# Patient Record
Sex: FEMALE | Race: WHITE | Employment: OTHER | URBAN - NONMETROPOLITAN AREA
[De-identification: names, ages, dates, MRNs, and addresses within clinical notes are randomized per-mention and may not be internally consistent; named-entity substitution may affect disease eponyms.]

---

## 2017-11-21 ENCOUNTER — HOSPITAL ENCOUNTER (OUTPATIENT)
Dept: WOMENS IMAGING | Age: 82
Discharge: HOME | End: 2017-11-21
Payer: MEDICARE

## 2017-11-21 DIAGNOSIS — Z12.31 VISIT FOR SCREENING MAMMOGRAM: ICD-10-CM

## 2017-11-21 PROCEDURE — G0202 SCR MAMMO BI INCL CAD: HCPCS

## 2018-03-09 ENCOUNTER — HOSPITAL ENCOUNTER (OUTPATIENT)
Age: 83
Discharge: HOME | End: 2018-03-09
Payer: MEDICARE

## 2018-03-09 ENCOUNTER — HOSPITAL ENCOUNTER (OUTPATIENT)
Dept: GENERAL RADIOLOGY | Age: 83
Discharge: HOME | End: 2018-03-09
Payer: MEDICARE

## 2018-03-09 DIAGNOSIS — R10.12 LEFT UPPER QUADRANT PAIN: ICD-10-CM

## 2018-03-09 LAB
ANISOCYTOSIS: ABNORMAL
BASOPHILS # BLD: 0.4 %
BASOPHILS ABSOLUTE: 0 THOU/MM3 (ref 0–0.1)
EOSINOPHIL # BLD: 1.3 %
EOSINOPHILS ABSOLUTE: 0.1 THOU/MM3 (ref 0–0.4)
HCT VFR BLD CALC: 39.5 % (ref 37–47)
HEMOGLOBIN: 12.8 GM/DL (ref 12–16)
LYMPHOCYTES # BLD: 32.3 %
LYMPHOCYTES ABSOLUTE: 3.2 THOU/MM3 (ref 1–4.8)
MCH RBC QN AUTO: 27.7 PG (ref 27–31)
MCHC RBC AUTO-ENTMCNC: 32.3 GM/DL (ref 33–37)
MCV RBC AUTO: 85.8 FL (ref 81–99)
MONOCYTES # BLD: 7.8 %
MONOCYTES ABSOLUTE: 0.8 THOU/MM3 (ref 0.4–1.3)
NUCLEATED RED BLOOD CELLS: 0 /100 WBC
PDW BLD-RTO: 15.6 % (ref 11.5–14.5)
PLATELET # BLD: 325 THOU/MM3 (ref 130–400)
PMV BLD AUTO: 9 FL (ref 7.4–10.4)
RBC # BLD: 4.61 MILL/MM3 (ref 4.2–5.4)
SEG NEUTROPHILS: 58.2 %
SEGMENTED NEUTROPHILS ABSOLUTE COUNT: 5.7 THOU/MM3 (ref 1.8–7.7)
WBC # BLD: 9.8 THOU/MM3 (ref 4.8–10.8)

## 2018-03-09 PROCEDURE — 74018 RADEX ABDOMEN 1 VIEW: CPT

## 2018-03-09 PROCEDURE — 36415 COLL VENOUS BLD VENIPUNCTURE: CPT

## 2018-03-09 PROCEDURE — 85025 COMPLETE CBC W/AUTO DIFF WBC: CPT

## 2018-03-10 ENCOUNTER — APPOINTMENT (OUTPATIENT)
Dept: GENERAL RADIOLOGY | Age: 83
End: 2018-03-10
Payer: MEDICARE

## 2018-03-10 ENCOUNTER — APPOINTMENT (OUTPATIENT)
Dept: CT IMAGING | Age: 83
End: 2018-03-10
Payer: MEDICARE

## 2018-03-10 ENCOUNTER — HOSPITAL ENCOUNTER (OUTPATIENT)
Age: 83
Setting detail: OBSERVATION
Discharge: HOME | End: 2018-03-12
Attending: INTERNAL MEDICINE | Admitting: INTERNAL MEDICINE
Payer: MEDICARE

## 2018-03-10 DIAGNOSIS — R10.9 ABDOMINAL PAIN, UNSPECIFIED ABDOMINAL LOCATION: Primary | ICD-10-CM

## 2018-03-10 PROBLEM — R62.7 FTT (FAILURE TO THRIVE) IN ADULT: Status: ACTIVE | Noted: 2018-03-10

## 2018-03-10 PROBLEM — E86.0 DEHYDRATION, MODERATE: Status: ACTIVE | Noted: 2018-03-10

## 2018-03-10 PROBLEM — E11.9 DM (DIABETES MELLITUS) (HCC): Status: ACTIVE | Noted: 2018-03-10

## 2018-03-10 PROBLEM — E03.9 HYPOTHYROIDISM: Status: ACTIVE | Noted: 2018-03-10

## 2018-03-10 LAB
ALBUMIN SERPL-MCNC: 4.1 G/DL (ref 3.5–5.1)
ALP BLD-CCNC: 66 U/L (ref 38–126)
ALT SERPL-CCNC: 15 U/L (ref 11–66)
ANION GAP SERPL CALCULATED.3IONS-SCNC: 15 MEQ/L (ref 8–16)
ANISOCYTOSIS: ABNORMAL
AST SERPL-CCNC: 18 U/L (ref 5–40)
BASOPHILS # BLD: 0.4 %
BASOPHILS ABSOLUTE: 0 THOU/MM3 (ref 0–0.1)
BILIRUB SERPL-MCNC: 0.5 MG/DL (ref 0.3–1.2)
BILIRUBIN DIRECT: < 0.2 MG/DL (ref 0–0.3)
BILIRUBIN URINE: NEGATIVE
BLOOD, URINE: NEGATIVE
BUN BLDV-MCNC: 14 MG/DL (ref 7–22)
CALCIUM SERPL-MCNC: 10 MG/DL (ref 8.5–10.5)
CHARACTER, URINE: CLEAR
CHLORIDE BLD-SCNC: 93 MEQ/L (ref 98–111)
CO2: 27 MEQ/L (ref 23–33)
COLOR: YELLOW
CREAT SERPL-MCNC: 0.7 MG/DL (ref 0.4–1.2)
EKG ATRIAL RATE: 80 BPM
EKG P AXIS: 62 DEGREES
EKG P-R INTERVAL: 166 MS
EKG Q-T INTERVAL: 356 MS
EKG QRS DURATION: 90 MS
EKG QTC CALCULATION (BAZETT): 410 MS
EKG R AXIS: -13 DEGREES
EKG T AXIS: 80 DEGREES
EKG VENTRICULAR RATE: 80 BPM
EOSINOPHIL # BLD: 0.5 %
EOSINOPHILS ABSOLUTE: 0 THOU/MM3 (ref 0–0.4)
GFR SERPL CREATININE-BSD FRML MDRD: 80 ML/MIN/1.73M2
GLUCOSE BLD-MCNC: 102 MG/DL (ref 70–108)
GLUCOSE URINE: NEGATIVE MG/DL
HCT VFR BLD CALC: 39.3 % (ref 37–47)
HEMOGLOBIN: 13.2 GM/DL (ref 12–16)
KETONES, URINE: NEGATIVE
LACTIC ACID, SEPSIS: 1.1 MMOL/L (ref 0.5–1.9)
LEUKOCYTE ESTERASE, URINE: NEGATIVE
LIPASE: 51.5 U/L (ref 5.6–51.3)
LYMPHOCYTES # BLD: 27.6 %
LYMPHOCYTES ABSOLUTE: 2.6 THOU/MM3 (ref 1–4.8)
MAGNESIUM: 1.9 MG/DL (ref 1.6–2.4)
MCH RBC QN AUTO: 28.4 PG (ref 27–31)
MCHC RBC AUTO-ENTMCNC: 33.6 GM/DL (ref 33–37)
MCV RBC AUTO: 84.6 FL (ref 81–99)
MONOCYTES # BLD: 7 %
MONOCYTES ABSOLUTE: 0.7 THOU/MM3 (ref 0.4–1.3)
NITRITE, URINE: NEGATIVE
NUCLEATED RED BLOOD CELLS: 0 /100 WBC
OSMOLALITY CALCULATION: 270.8 MOSMOL/KG (ref 275–300)
PDW BLD-RTO: 14.9 % (ref 11.5–14.5)
PH UA: 5.5
PLATELET # BLD: 295 THOU/MM3 (ref 130–400)
PMV BLD AUTO: 8.3 FL (ref 7.4–10.4)
POTASSIUM SERPL-SCNC: 4 MEQ/L (ref 3.5–5.2)
PROCALCITONIN: 0.04 NG/ML (ref 0.01–0.09)
PROTEIN UA: NEGATIVE
RBC # BLD: 4.64 MILL/MM3 (ref 4.2–5.4)
SEG NEUTROPHILS: 64.5 %
SEGMENTED NEUTROPHILS ABSOLUTE COUNT: 6.1 THOU/MM3 (ref 1.8–7.7)
SODIUM BLD-SCNC: 135 MEQ/L (ref 135–145)
SPECIFIC GRAVITY, URINE: 1.01 (ref 1–1.03)
TOTAL PROTEIN: 8.3 G/DL (ref 6.1–8)
TROPONIN T: < 0.01 NG/ML
UROBILINOGEN, URINE: 0.2 EU/DL
WBC # BLD: 9.4 THOU/MM3 (ref 4.8–10.8)

## 2018-03-10 PROCEDURE — 96374 THER/PROPH/DIAG INJ IV PUSH: CPT

## 2018-03-10 PROCEDURE — 83605 ASSAY OF LACTIC ACID: CPT

## 2018-03-10 PROCEDURE — 6360000002 HC RX W HCPCS: Performed by: PHYSICIAN ASSISTANT

## 2018-03-10 PROCEDURE — 83735 ASSAY OF MAGNESIUM: CPT

## 2018-03-10 PROCEDURE — 2580000003 HC RX 258: Performed by: INTERNAL MEDICINE

## 2018-03-10 PROCEDURE — 99285 EMERGENCY DEPT VISIT HI MDM: CPT

## 2018-03-10 PROCEDURE — 96367 TX/PROPH/DG ADDL SEQ IV INF: CPT

## 2018-03-10 PROCEDURE — 6370000000 HC RX 637 (ALT 250 FOR IP): Performed by: INTERNAL MEDICINE

## 2018-03-10 PROCEDURE — 82248 BILIRUBIN DIRECT: CPT

## 2018-03-10 PROCEDURE — 71046 X-RAY EXAM CHEST 2 VIEWS: CPT

## 2018-03-10 PROCEDURE — 96365 THER/PROPH/DIAG IV INF INIT: CPT

## 2018-03-10 PROCEDURE — 6360000002 HC RX W HCPCS: Performed by: INTERNAL MEDICINE

## 2018-03-10 PROCEDURE — 93005 ELECTROCARDIOGRAM TRACING: CPT | Performed by: PHYSICIAN ASSISTANT

## 2018-03-10 PROCEDURE — 81003 URINALYSIS AUTO W/O SCOPE: CPT

## 2018-03-10 PROCEDURE — 93010 ELECTROCARDIOGRAM REPORT: CPT | Performed by: INTERNAL MEDICINE

## 2018-03-10 PROCEDURE — 96372 THER/PROPH/DIAG INJ SC/IM: CPT

## 2018-03-10 PROCEDURE — 36415 COLL VENOUS BLD VENIPUNCTURE: CPT

## 2018-03-10 PROCEDURE — 99220 PR INITIAL OBSERVATION CARE/DAY 70 MINUTES: CPT | Performed by: INTERNAL MEDICINE

## 2018-03-10 PROCEDURE — 85025 COMPLETE CBC W/AUTO DIFF WBC: CPT

## 2018-03-10 PROCEDURE — 84145 PROCALCITONIN (PCT): CPT

## 2018-03-10 PROCEDURE — 74177 CT ABD & PELVIS W/CONTRAST: CPT

## 2018-03-10 PROCEDURE — G0378 HOSPITAL OBSERVATION PER HR: HCPCS

## 2018-03-10 PROCEDURE — 80053 COMPREHEN METABOLIC PANEL: CPT

## 2018-03-10 PROCEDURE — 6360000004 HC RX CONTRAST MEDICATION: Performed by: PHYSICIAN ASSISTANT

## 2018-03-10 PROCEDURE — 84484 ASSAY OF TROPONIN QUANT: CPT

## 2018-03-10 PROCEDURE — 83690 ASSAY OF LIPASE: CPT

## 2018-03-10 PROCEDURE — 87040 BLOOD CULTURE FOR BACTERIA: CPT

## 2018-03-10 PROCEDURE — 96375 TX/PRO/DX INJ NEW DRUG ADDON: CPT

## 2018-03-10 PROCEDURE — 2500000003 HC RX 250 WO HCPCS: Performed by: INTERNAL MEDICINE

## 2018-03-10 RX ORDER — DOCUSATE SODIUM 100 MG/1
100 CAPSULE, LIQUID FILLED ORAL 2 TIMES DAILY
Status: DISCONTINUED | OUTPATIENT
Start: 2018-03-10 | End: 2018-03-12 | Stop reason: HOSPADM

## 2018-03-10 RX ORDER — SODIUM CHLORIDE 0.9 % (FLUSH) 0.9 %
10 SYRINGE (ML) INJECTION PRN
Status: DISCONTINUED | OUTPATIENT
Start: 2018-03-10 | End: 2018-03-12 | Stop reason: HOSPADM

## 2018-03-10 RX ORDER — ONDANSETRON 2 MG/ML
4 INJECTION INTRAMUSCULAR; INTRAVENOUS ONCE
Status: COMPLETED | OUTPATIENT
Start: 2018-03-10 | End: 2018-03-10

## 2018-03-10 RX ORDER — FOLIC ACID 1 MG/1
1 TABLET ORAL DAILY
Status: DISCONTINUED | OUTPATIENT
Start: 2018-03-11 | End: 2018-03-12 | Stop reason: HOSPADM

## 2018-03-10 RX ORDER — METRONIDAZOLE 500 MG/1
500 TABLET ORAL 3 TIMES DAILY
Status: ON HOLD | COMMUNITY
End: 2018-03-12

## 2018-03-10 RX ORDER — CIPROFLOXACIN 500 MG/1
500 TABLET, FILM COATED ORAL 2 TIMES DAILY
Status: DISCONTINUED | OUTPATIENT
Start: 2018-03-10 | End: 2018-03-10

## 2018-03-10 RX ORDER — ONDANSETRON 2 MG/ML
4 INJECTION INTRAMUSCULAR; INTRAVENOUS EVERY 6 HOURS PRN
Status: DISCONTINUED | OUTPATIENT
Start: 2018-03-10 | End: 2018-03-12 | Stop reason: HOSPADM

## 2018-03-10 RX ORDER — SODIUM CHLORIDE 0.9 % (FLUSH) 0.9 %
10 SYRINGE (ML) INJECTION PRN
Status: DISCONTINUED | OUTPATIENT
Start: 2018-03-10 | End: 2018-03-10 | Stop reason: SDUPTHER

## 2018-03-10 RX ORDER — BISACODYL 10 MG
10 SUPPOSITORY, RECTAL RECTAL DAILY PRN
Status: DISCONTINUED | OUTPATIENT
Start: 2018-03-10 | End: 2018-03-12 | Stop reason: HOSPADM

## 2018-03-10 RX ORDER — METRONIDAZOLE 500 MG/1
500 TABLET ORAL 3 TIMES DAILY
Status: DISCONTINUED | OUTPATIENT
Start: 2018-03-10 | End: 2018-03-10

## 2018-03-10 RX ORDER — CIPROFLOXACIN 2 MG/ML
400 INJECTION, SOLUTION INTRAVENOUS EVERY 12 HOURS
Status: DISCONTINUED | OUTPATIENT
Start: 2018-03-10 | End: 2018-03-10

## 2018-03-10 RX ORDER — PRAVASTATIN SODIUM 20 MG
20 TABLET ORAL 2 TIMES DAILY
Status: DISCONTINUED | OUTPATIENT
Start: 2018-03-10 | End: 2018-03-10

## 2018-03-10 RX ORDER — SODIUM CHLORIDE 0.9 % (FLUSH) 0.9 %
10 SYRINGE (ML) INJECTION EVERY 12 HOURS SCHEDULED
Status: DISCONTINUED | OUTPATIENT
Start: 2018-03-10 | End: 2018-03-10 | Stop reason: SDUPTHER

## 2018-03-10 RX ORDER — VENLAFAXINE HYDROCHLORIDE 150 MG/1
150 CAPSULE, EXTENDED RELEASE ORAL
Status: DISCONTINUED | OUTPATIENT
Start: 2018-03-11 | End: 2018-03-12 | Stop reason: HOSPADM

## 2018-03-10 RX ORDER — LEVOTHYROXINE SODIUM 0.05 MG/1
50 TABLET ORAL DAILY
Status: DISCONTINUED | OUTPATIENT
Start: 2018-03-11 | End: 2018-03-12 | Stop reason: HOSPADM

## 2018-03-10 RX ORDER — ACETAMINOPHEN 325 MG/1
650 TABLET ORAL EVERY 4 HOURS PRN
Status: DISCONTINUED | OUTPATIENT
Start: 2018-03-10 | End: 2018-03-12 | Stop reason: HOSPADM

## 2018-03-10 RX ORDER — SODIUM CHLORIDE 9 MG/ML
INJECTION, SOLUTION INTRAVENOUS CONTINUOUS
Status: ACTIVE | OUTPATIENT
Start: 2018-03-10 | End: 2018-03-11

## 2018-03-10 RX ORDER — VENLAFAXINE 50 MG/1
150 TABLET ORAL DAILY
Status: DISCONTINUED | OUTPATIENT
Start: 2018-03-11 | End: 2018-03-10

## 2018-03-10 RX ORDER — PRAVASTATIN SODIUM 20 MG
20 TABLET ORAL NIGHTLY
Status: DISCONTINUED | OUTPATIENT
Start: 2018-03-10 | End: 2018-03-12 | Stop reason: HOSPADM

## 2018-03-10 RX ORDER — ACETAMINOPHEN 325 MG/1
650 TABLET ORAL EVERY 4 HOURS PRN
Status: DISCONTINUED | OUTPATIENT
Start: 2018-03-10 | End: 2018-03-10 | Stop reason: SDUPTHER

## 2018-03-10 RX ORDER — CIPROFLOXACIN 500 MG/1
500 TABLET, FILM COATED ORAL 2 TIMES DAILY
Status: ON HOLD | COMMUNITY
End: 2018-03-12 | Stop reason: HOSPADM

## 2018-03-10 RX ORDER — VENLAFAXINE HYDROCHLORIDE 150 MG/1
150 TABLET, EXTENDED RELEASE ORAL
Status: ON HOLD | COMMUNITY
End: 2018-03-28 | Stop reason: HOSPADM

## 2018-03-10 RX ORDER — OXYCODONE HYDROCHLORIDE 5 MG/1
5 TABLET ORAL EVERY 4 HOURS PRN
Status: DISCONTINUED | OUTPATIENT
Start: 2018-03-10 | End: 2018-03-12 | Stop reason: HOSPADM

## 2018-03-10 RX ORDER — SODIUM CHLORIDE 0.9 % (FLUSH) 0.9 %
10 SYRINGE (ML) INJECTION EVERY 12 HOURS SCHEDULED
Status: DISCONTINUED | OUTPATIENT
Start: 2018-03-10 | End: 2018-03-12 | Stop reason: HOSPADM

## 2018-03-10 RX ORDER — MORPHINE SULFATE 2 MG/ML
2 INJECTION, SOLUTION INTRAMUSCULAR; INTRAVENOUS ONCE
Status: COMPLETED | OUTPATIENT
Start: 2018-03-10 | End: 2018-03-10

## 2018-03-10 RX ADMIN — AZTREONAM 1 G: 1 INJECTION, POWDER, LYOPHILIZED, FOR SOLUTION INTRAMUSCULAR; INTRAVENOUS at 22:03

## 2018-03-10 RX ADMIN — METRONIDAZOLE 500 MG: 500 INJECTION, SOLUTION INTRAVENOUS at 20:54

## 2018-03-10 RX ADMIN — ENOXAPARIN SODIUM 40 MG: 40 INJECTION SUBCUTANEOUS at 20:54

## 2018-03-10 RX ADMIN — ONDANSETRON 4 MG: 2 INJECTION INTRAMUSCULAR; INTRAVENOUS at 14:56

## 2018-03-10 RX ADMIN — IOPAMIDOL 80 ML: 755 INJECTION, SOLUTION INTRAVENOUS at 16:01

## 2018-03-10 RX ADMIN — DOCUSATE SODIUM 100 MG: 100 CAPSULE ORAL at 22:04

## 2018-03-10 RX ADMIN — PRAVASTATIN SODIUM 20 MG: 20 TABLET ORAL at 22:05

## 2018-03-10 RX ADMIN — SODIUM CHLORIDE: 9 INJECTION, SOLUTION INTRAVENOUS at 19:14

## 2018-03-10 RX ADMIN — CIPROFLOXACIN 400 MG: 2 INJECTION, SOLUTION INTRAVENOUS at 20:10

## 2018-03-10 RX ADMIN — MORPHINE SULFATE 2 MG: 2 INJECTION, SOLUTION INTRAMUSCULAR; INTRAVENOUS at 14:56

## 2018-03-10 ASSESSMENT — ENCOUNTER SYMPTOMS
EYE DISCHARGE: 0
VOMITING: 0
EYE PAIN: 0
WHEEZING: 0
COUGH: 0
RHINORRHEA: 0
ABDOMINAL PAIN: 1
NAUSEA: 1
SORE THROAT: 0
BACK PAIN: 0
SHORTNESS OF BREATH: 1
DIARRHEA: 1

## 2018-03-10 ASSESSMENT — PAIN DESCRIPTION - LOCATION
LOCATION: ABDOMEN
LOCATION: ABDOMEN

## 2018-03-10 ASSESSMENT — PAIN SCALES - GENERAL
PAINLEVEL_OUTOF10: 0
PAINLEVEL_OUTOF10: 4
PAINLEVEL_OUTOF10: 0
PAINLEVEL_OUTOF10: 4
PAINLEVEL_OUTOF10: 2

## 2018-03-10 ASSESSMENT — PAIN DESCRIPTION - PAIN TYPE
TYPE: ACUTE PAIN;CHRONIC PAIN
TYPE: ACUTE PAIN;CHRONIC PAIN

## 2018-03-10 ASSESSMENT — PAIN DESCRIPTION - ORIENTATION
ORIENTATION: LOWER
ORIENTATION: LOWER

## 2018-03-10 NOTE — H&P
metroNIDAZOLE (FLAGYL) 500 MG tablet Take 500 mg by mouth 3 times daily   Yes Historical Provider, MD   ciprofloxacin (CIPRO) 500 MG tablet Take 500 mg by mouth 2 times daily   Yes Historical Provider, MD   pravastatin (PRAVACHOL) 40 MG tablet Take 20 mg by mouth 2 times daily    Yes Historical Provider, MD   folic acid (FOLVITE) 1 MG tablet Take 1 tablet by mouth daily. 1/3/13  Yes Benjamin Restrepo MD   levothyroxine (SYNTHROID) 50 MCG tablet Take 50 mcg by mouth Daily. Yes Historical Provider, MD   venlafaxine (EFFEXOR) 75 MG tablet Take 150 mg by mouth daily    Yes Historical Provider, MD   metformin (GLUCOPHAGE) 500 MG tablet Take 500 mg by mouth 2 times daily (with meals). Yes Historical Provider, MD       Allergies:  Pcn [penicillins]    Social History:        TOBACCO:   reports that she has never smoked. She has never used smokeless tobacco.  ETOH:   reports that she does not drink alcohol. Family History:     Reviewed in detail and negative for DM, CAD, Cancer, CVA.  Positive as follows:        Problem Relation Age of Onset    Asthma Mother      hayfever    Breast Cancer Mother     Cancer Mother      stomach    Hearing Loss Father     Vision Loss Father     Cancer Sister      ovarian or uterine and lung    Birth Defects Brother     Early Death Brother     Vision Loss Brother     Diabetes Other    [de-identified] / Stillbirths Daughter     Alcohol Abuse Neg Hx     Allergy (Severe) Neg Hx     Anemia Neg Hx     Arthritis Neg Hx     Arrhythmia Neg Hx     Atrial Fibrillation Neg Hx     Coronary Art Dis Neg Hx     Colon Cancer Neg Hx     Depression Neg Hx     Heart Attack Neg Hx     Heart Disease Neg Hx     High Blood Pressure Neg Hx     High Cholesterol Neg Hx     Kidney Disease Neg Hx     Learning Disabilities Neg Hx     Mental Illness Neg Hx     Prostate Cancer Neg Hx     Obesity Neg Hx     Stroke Neg Hx     Substance Abuse Neg Hx     Osteoporosis Neg Hx        Diet:

## 2018-03-10 NOTE — ED PROVIDER NOTES
[facility]  eMERGENCY dEPARTMENT eNCOUnter          CHIEF COMPLAINT       Chief Complaint   Patient presents with    Anxiety    Delusional    Nausea    Diverticulitis       Nurses Notes reviewed and I agree except as noted in the HPI. HISTORY OF PRESENT ILLNESS    Lizzette Loza is a 80 y.o. female who presents to the Emergency Department with a medical history of diverticulitis for the evaluation of fatigue and abdominal pain with onset occurring yesterday. The patient's family reports that the patient was taken to Urgent care yesterday for her abdominal pain where she was diagnosed with a diverticulitis flare up. She rates her current abdominal pain as a 4/10 in severity. Patient states to have been started to Cipro and Metronidazole. In addition to her abdominal pain and fatigue, the patient reports to be experiencing nausea, diarrhea and shortness of breath. Family states that the patient has become more anxious with episodes of delusions since being placed on Cipro and metronidazole. Patient denies fever, cough, vomiting, urinary frequency, urinary urgency, dysuria, or chest pain. No additional symptoms or complaints at this time. Location/Symptom: abdominal pain and fatigue  Timing/Onset: yesterday  Context/Setting: sudden onset  Quality: similar to diverticulitis  Duration: aching  Modifying Factors: none  Severity: 4/10    The HPI was provided by the patient. REVIEW OF SYSTEMS     Review of Systems   Constitutional: Positive for fatigue. Negative for appetite change, chills and fever. HENT: Negative for congestion, ear pain, rhinorrhea and sore throat. Eyes: Negative for pain, discharge and visual disturbance. Respiratory: Positive for shortness of breath. Negative for cough and wheezing. Cardiovascular: Negative for chest pain, palpitations and leg swelling. Gastrointestinal: Positive for abdominal pain, diarrhea and nausea. Negative for vomiting.    Genitourinary: Negative for difficulty urinating, dysuria and vaginal discharge. Musculoskeletal: Negative for arthralgias, back pain, joint swelling and neck pain. Skin: Negative for pallor and rash. Neurological: Negative for dizziness, syncope, weakness, light-headedness and headaches. Hematological: Negative for adenopathy. Psychiatric/Behavioral: Negative for confusion, dysphoric mood and suicidal ideas. The patient is not nervous/anxious. Delusions       PAST MEDICAL HISTORY    has a past medical history of Depression; Diabetes mellitus ([address] and Thyroid disease. SURGICAL HISTORY      has a past surgical history that includes joint replacement. CURRENT MEDICATIONS       Previous Medications    CIPROFLOXACIN (CIPRO) 500 MG TABLET    Take 500 mg by mouth 2 times daily    FOLIC ACID (FOLVITE) 1 MG TABLET    Take 1 tablet by mouth daily. LEVOTHYROXINE (SYNTHROID) 50 MCG TABLET    Take 50 mcg by mouth Daily. METFORMIN (GLUCOPHAGE) 500 MG TABLET    Take 500 mg by mouth 2 times daily (with meals). METRONIDAZOLE (FLAGYL) 500 MG TABLET    Take 500 mg by mouth 3 times daily    PRAVASTATIN (PRAVACHOL) 40 MG TABLET    Take 20 mg by mouth 2 times daily     VENLAFAXINE (EFFEXOR) 75 MG TABLET    Take 150 mg by mouth daily        ALLERGIES     is allergic to pcn [penicillins]. FAMILY HISTORY     has no family status information on file. family history is not on file. SOCIAL HISTORY      reports that she has never smoked. She has never used smokeless tobacco. She reports that she does not drink alcohol or use drugs. PHYSICAL EXAM     INITIAL VITALS:  oral temperature is 97.4 °F (36.3 °C). Her blood pressure is 118/56 (abnormal) and her pulse is 76. Her respiration is 16 and oxygen saturation is 97%. Physical Exam   Constitutional: She is oriented to person, place, and time. She appears well-developed and well-nourished. HENT:   Head: Normocephalic and atraumatic.    Right Ear: External ear

## 2018-03-10 NOTE — ED TRIAGE NOTES
Patient presents with nausea and abdominal pain. Family states she also has been having delusions since starting her flagyl and cipro yesterday. Family states they took patient to urgent care yesterday for the abdominal pain and was diagnosed with a diverticulitis flare up, which she does have a history of.  Patients family states since the new medications patient has been having delusions and anxiety

## 2018-03-11 LAB
ANION GAP SERPL CALCULATED.3IONS-SCNC: 14 MEQ/L (ref 8–16)
BUN BLDV-MCNC: 11 MG/DL (ref 7–22)
CALCIUM SERPL-MCNC: 9.1 MG/DL (ref 8.5–10.5)
CHLORIDE BLD-SCNC: 98 MEQ/L (ref 98–111)
CLOSTRIDIUM DIFFICILE, PCR: NEGATIVE
CO2: 25 MEQ/L (ref 23–33)
CREAT SERPL-MCNC: 0.6 MG/DL (ref 0.4–1.2)
GFR SERPL CREATININE-BSD FRML MDRD: > 90 ML/MIN/1.73M2
GLUCOSE BLD-MCNC: 87 MG/DL (ref 70–108)
MAGNESIUM: 2.1 MG/DL (ref 1.6–2.4)
POTASSIUM REFLEX MAGNESIUM: 3.7 MEQ/L (ref 3.5–5.2)
SODIUM BLD-SCNC: 137 MEQ/L (ref 135–145)
TSH SERPL DL<=0.05 MIU/L-ACNC: 1.6 UIU/ML (ref 0.4–4.2)

## 2018-03-11 PROCEDURE — 6360000002 HC RX W HCPCS: Performed by: INTERNAL MEDICINE

## 2018-03-11 PROCEDURE — 96376 TX/PRO/DX INJ SAME DRUG ADON: CPT

## 2018-03-11 PROCEDURE — 99232 SBSQ HOSP IP/OBS MODERATE 35: CPT | Performed by: INTERNAL MEDICINE

## 2018-03-11 PROCEDURE — 96372 THER/PROPH/DIAG INJ SC/IM: CPT

## 2018-03-11 PROCEDURE — 87493 C DIFF AMPLIFIED PROBE: CPT

## 2018-03-11 PROCEDURE — 90670 PCV13 VACCINE IM: CPT | Performed by: INTERNAL MEDICINE

## 2018-03-11 PROCEDURE — G0378 HOSPITAL OBSERVATION PER HR: HCPCS

## 2018-03-11 PROCEDURE — 36415 COLL VENOUS BLD VENIPUNCTURE: CPT

## 2018-03-11 PROCEDURE — G0009 ADMIN PNEUMOCOCCAL VACCINE: HCPCS | Performed by: INTERNAL MEDICINE

## 2018-03-11 PROCEDURE — 2500000003 HC RX 250 WO HCPCS: Performed by: INTERNAL MEDICINE

## 2018-03-11 PROCEDURE — 6370000000 HC RX 637 (ALT 250 FOR IP): Performed by: INTERNAL MEDICINE

## 2018-03-11 PROCEDURE — 84443 ASSAY THYROID STIM HORMONE: CPT

## 2018-03-11 PROCEDURE — 83735 ASSAY OF MAGNESIUM: CPT

## 2018-03-11 PROCEDURE — 96366 THER/PROPH/DIAG IV INF ADDON: CPT

## 2018-03-11 PROCEDURE — 2580000003 HC RX 258: Performed by: INTERNAL MEDICINE

## 2018-03-11 PROCEDURE — 80048 BASIC METABOLIC PNL TOTAL CA: CPT

## 2018-03-11 RX ADMIN — FOLIC ACID 1 MG: 1 TABLET ORAL at 08:48

## 2018-03-11 RX ADMIN — METRONIDAZOLE 500 MG: 500 INJECTION, SOLUTION INTRAVENOUS at 12:45

## 2018-03-11 RX ADMIN — METRONIDAZOLE 500 MG: 500 INJECTION, SOLUTION INTRAVENOUS at 05:08

## 2018-03-11 RX ADMIN — VENLAFAXINE HYDROCHLORIDE 150 MG: 150 CAPSULE, EXTENDED RELEASE ORAL at 08:48

## 2018-03-11 RX ADMIN — METRONIDAZOLE 500 MG: 500 INJECTION, SOLUTION INTRAVENOUS at 20:23

## 2018-03-11 RX ADMIN — PRAVASTATIN SODIUM 20 MG: 20 TABLET ORAL at 19:57

## 2018-03-11 RX ADMIN — DOCUSATE SODIUM 100 MG: 100 CAPSULE ORAL at 19:57

## 2018-03-11 RX ADMIN — AZTREONAM 1 G: 1 INJECTION, POWDER, LYOPHILIZED, FOR SOLUTION INTRAMUSCULAR; INTRAVENOUS at 19:47

## 2018-03-11 RX ADMIN — ENOXAPARIN SODIUM 40 MG: 40 INJECTION SUBCUTANEOUS at 19:56

## 2018-03-11 RX ADMIN — PNEUMOCOCCAL 13-VALENT CONJUGATE VACCINE 0.5 ML: 2.2; 2.2; 2.2; 2.2; 2.2; 4.4; 2.2; 2.2; 2.2; 2.2; 2.2; 2.2; 2.2 INJECTION, SUSPENSION INTRAMUSCULAR at 07:38

## 2018-03-11 RX ADMIN — LEVOTHYROXINE SODIUM 50 MCG: 50 TABLET ORAL at 05:18

## 2018-03-11 RX ADMIN — AZTREONAM 1 G: 1 INJECTION, POWDER, LYOPHILIZED, FOR SOLUTION INTRAMUSCULAR; INTRAVENOUS at 06:15

## 2018-03-11 RX ADMIN — AZTREONAM 1 G: 1 INJECTION, POWDER, LYOPHILIZED, FOR SOLUTION INTRAMUSCULAR; INTRAVENOUS at 12:02

## 2018-03-11 ASSESSMENT — PAIN SCALES - GENERAL
PAINLEVEL_OUTOF10: 0

## 2018-03-12 VITALS
OXYGEN SATURATION: 96 % | RESPIRATION RATE: 16 BRPM | HEART RATE: 77 BPM | TEMPERATURE: 98.2 F | DIASTOLIC BLOOD PRESSURE: 59 MMHG | SYSTOLIC BLOOD PRESSURE: 126 MMHG

## 2018-03-12 PROCEDURE — 96366 THER/PROPH/DIAG IV INF ADDON: CPT

## 2018-03-12 PROCEDURE — 99239 HOSP IP/OBS DSCHRG MGMT >30: CPT | Performed by: INTERNAL MEDICINE

## 2018-03-12 PROCEDURE — 97165 OT EVAL LOW COMPLEX 30 MIN: CPT

## 2018-03-12 PROCEDURE — 96376 TX/PRO/DX INJ SAME DRUG ADON: CPT

## 2018-03-12 PROCEDURE — 97161 PT EVAL LOW COMPLEX 20 MIN: CPT

## 2018-03-12 PROCEDURE — 97535 SELF CARE MNGMENT TRAINING: CPT

## 2018-03-12 PROCEDURE — 2500000003 HC RX 250 WO HCPCS: Performed by: INTERNAL MEDICINE

## 2018-03-12 PROCEDURE — 2580000003 HC RX 258: Performed by: INTERNAL MEDICINE

## 2018-03-12 PROCEDURE — G8987 SELF CARE CURRENT STATUS: HCPCS

## 2018-03-12 PROCEDURE — 6360000002 HC RX W HCPCS: Performed by: INTERNAL MEDICINE

## 2018-03-12 PROCEDURE — 97530 THERAPEUTIC ACTIVITIES: CPT

## 2018-03-12 PROCEDURE — 6370000000 HC RX 637 (ALT 250 FOR IP): Performed by: INTERNAL MEDICINE

## 2018-03-12 PROCEDURE — G8988 SELF CARE GOAL STATUS: HCPCS

## 2018-03-12 PROCEDURE — G8980 MOBILITY D/C STATUS: HCPCS

## 2018-03-12 PROCEDURE — G0378 HOSPITAL OBSERVATION PER HR: HCPCS

## 2018-03-12 PROCEDURE — G8979 MOBILITY GOAL STATUS: HCPCS

## 2018-03-12 PROCEDURE — G8978 MOBILITY CURRENT STATUS: HCPCS

## 2018-03-12 PROCEDURE — G8989 SELF CARE D/C STATUS: HCPCS

## 2018-03-12 RX ORDER — PSEUDOEPHEDRINE HCL 30 MG
100 TABLET ORAL 2 TIMES DAILY
Qty: 30 CAPSULE | Refills: 0 | Status: SHIPPED | OUTPATIENT
Start: 2018-03-12

## 2018-03-12 RX ORDER — METRONIDAZOLE 500 MG/1
500 TABLET ORAL 3 TIMES DAILY
Qty: 24 TABLET | Refills: 0 | Status: SHIPPED | OUTPATIENT
Start: 2018-03-12 | End: 2018-03-20

## 2018-03-12 RX ADMIN — AZTREONAM 1 G: 1 INJECTION, POWDER, LYOPHILIZED, FOR SOLUTION INTRAMUSCULAR; INTRAVENOUS at 03:40

## 2018-03-12 RX ADMIN — Medication 10 ML: at 08:35

## 2018-03-12 RX ADMIN — FOLIC ACID 1 MG: 1 TABLET ORAL at 08:28

## 2018-03-12 RX ADMIN — AZTREONAM 1 G: 1 INJECTION, POWDER, LYOPHILIZED, FOR SOLUTION INTRAMUSCULAR; INTRAVENOUS at 11:29

## 2018-03-12 RX ADMIN — ACETAMINOPHEN 650 MG: 325 TABLET ORAL at 03:31

## 2018-03-12 RX ADMIN — ONDANSETRON 4 MG: 2 INJECTION INTRAMUSCULAR; INTRAVENOUS at 03:31

## 2018-03-12 RX ADMIN — METRONIDAZOLE 500 MG: 500 INJECTION, SOLUTION INTRAVENOUS at 12:03

## 2018-03-12 RX ADMIN — LEVOTHYROXINE SODIUM 50 MCG: 50 TABLET ORAL at 07:50

## 2018-03-12 RX ADMIN — METRONIDAZOLE 500 MG: 500 INJECTION, SOLUTION INTRAVENOUS at 04:04

## 2018-03-12 RX ADMIN — VENLAFAXINE HYDROCHLORIDE 150 MG: 150 CAPSULE, EXTENDED RELEASE ORAL at 07:50

## 2018-03-12 RX ADMIN — DOCUSATE SODIUM 100 MG: 100 CAPSULE ORAL at 08:28

## 2018-03-12 ASSESSMENT — PAIN SCALES - GENERAL
PAINLEVEL_OUTOF10: 0
PAINLEVEL_OUTOF10: 3
PAINLEVEL_OUTOF10: 0

## 2018-03-12 NOTE — PLAN OF CARE
Problem: Discharge Planning:  Goal: Patients continuum of care needs are met  Patients continuum of care needs are met  Outcome: Ongoing  Patient plans return home with possible HH. See SW note for details.

## 2018-03-12 NOTE — PLAN OF CARE
Problem: Falls - Risk of  Goal: Absence of falls  Outcome: Met This Shift  Patient free from falls this shift. Patient fall risk r/t. Continues on falling star program, siderails upx2-3, bed alarm on, call light in reach, bed in low and locked position. Hourly checks preformed, potty, position, pain, pathway and possessions assessed. Continuing to monitor. Problem: Risk for Impaired Skin Integrity  Goal: Tissue integrity - skin and mucous membranes  Structural intactness and normal physiological function of skin and  mucous membranes.    Outcome: Met This Shift  No breakdown noted to coccyx

## 2018-03-12 NOTE — DISCHARGE SUMMARY
discharge       Patient:  Ekta Rendon  YOB: 1935    MRN: [id number]     Acct: [de-identified]    PCP: Evelyn Rhoades MD    Date of Admission: 3/10/2018    Date of Service: Pt seen/examined on 3/10/18 and Placed in Observation. Chief Complaint:  Abd pain    Source: The ER Records and the patient  History Of Present Illness:   80 y.o. female who presented to [address] [place] [address]  with a medical history of diverticulitis for the evaluation of fatigue and abdominal pain with onset occurring yesterday. This was gradual in onset,mod in severity,aggravated by eating. No vomiting . Evelin Mediate Denied chest pain,sob ,cough,fever or chills. Per records, The patient's family reports that the patient was taken to Urgent care yesterday for her abdominal pain where she was diagnosed with a diverticulitis flare up. She rates her current abdominal pain as a 4/10 in severity. Patient states to have been started to Cipro and Metronidazole. In addition to her abdominal pain and fatigue, the patient reports to be experiencing nausea, diarrhea and shortness of breath. Family states that the patient has become more anxious with episodes of delusions since being placed on Cipro and metronidazole. Patient denies fever, cough, vomiting, urinary frequency, urinary urgency, dysuria, or chest pain. No additional symptoms or complaints at this time.     Subjective:-    Doing ok  admitted with diarrhea , vomiting and dehydration    improving  Scheduled Meds:   sodium chloride flush  10 mL Intravenous 2 times per day    enoxaparin  40 mg Subcutaneous Q24H    docusate sodium  100 mg Oral BID    folic acid  1 mg Oral Daily    levothyroxine  50 mcg Oral Daily    pravastatin  20 mg Oral Nightly    metroNIDAZOLE  500 mg Intravenous Q8H    aztreonam  1 g Intravenous Q8H    venlafaxine  150 mg Oral Daily with breakfast     Continuous Infusions:  PRN Meds:.sodium chloride flush, acetaminophen, ondansetron, oxyCODONE, 09/13/2016    BLOODU NEGATIVE 03/10/2018    SPECGRAV 1.019 09/13/2016    GLUCOSEU NEGATIVE 03/10/2018       Radiology:           CT ABDOMEN PELVIS W IV CONTRAST Additional Contrast? Oral   Final Result   1. Diverticulosis sigmoid and descending colon. No evidence for diverticulitis. Findings suggestive of impending diarrhea. 2. Cholelithiasis. No acute findings involving the gallbladder. **This report has been created using voice recognition software. It may contain minor errors which are inherent in voice recognition technology. **      Final report electronically signed by Dr. Dennis Toney on 3/10/2018 4:19 PM      XR CHEST STANDARD (2 VW)   Final Result      Stable radiographic appearance of the chest. No evidence of an acute intrathoracic process. **This report has been created using voice recognition software. It may contain minor errors which are inherent in voice recognition technology. **      Final report electronically signed by Dr. Oscar Sawyer on 3/10/2018 2:54 PM               DVT prophylaxis: [x] Lovenox                                 [x] SCDs                                 [] SQ Heparin                                 [] Encourage ambulation           [] Already on Anticoagulation    Code Status: Full Code      PT/OT Eval Status: pending    Disposition:    [x] Home       [] TCU       [] Rehab       [] Psych       [] SNF       [] [place]       [] Other-        C/Antonino Gerry [address] Problems    Diagnosis Date Noted    Abdominal pain [R10.9] 03/10/2018    Dehydration, moderate [E86.0] 03/10/2018    DM (diabetes mellitus) ([facility] [address] [E11.9] 03/10/2018    FTT (failure to thrive) in adult [R62.7] 03/10/2018    Hypothyroidism [E03.9] 87/19/1893   Metabolic encephalopathy,(dehydration/meds- RESOLVED  ACUTE GASTRONETRITIS - RESOLVED    PLAN:    1. Iv hydration- cstop - dehydration resolvved  2. Eating and drinking well  3. NO MORE DIARRHEA  4.  ADAT  5. PT OT

## 2018-03-13 NOTE — CARE COORDINATION
3/13/18, 10:30 AM  Late Entry    DISCHARGE BARRIERS      Received a call from Lynnette, daughter who states they want [facility]. Made referral to Raul Weaver at [facility]. Made Justine aware patient was discharged yesterday. Faxed H&P, demographics, AVS, and home health order. Spoke to Melvin to make her aware referral was made.
agency. Patient to be discharged home with new . Patient and family are agreeable to discharge plan. 3/12/18, 3:06 PM    Discharge plan discussed by  and . Discharge plan reviewed with patient/ family. Patient/ family verbalize understanding of discharge plan and are in agreement with plan. Understanding was demonstrated using the teach back method. IMM Letter  Observation Status Letter date given[de-identified] 03/10/18  Observation Status Letter time given[de-identified] 2300  Observation Status Letter given to Patient/Family/Significant other/Guardian/POA/by[de-identified] Observation notice given, signed and placed in chart by floor RN. I gave verbal explanation and phone number for any additional questions. Update: 3:15 PM- Daughter has not returned phone call. SW will follow tomorrow with [facility] if not return call is received. RN aware.

## 2018-03-13 NOTE — PROGRESS NOTES
Patient admitted to [phone] with co's abdominal pain that started this am per patient. Family states patient has not felt well since Tuesday or Wednesday. Patient is alert to person and place.  Int noted in left antecubital
functional limits       Pain:  Denies. Social/Functional History:    Lives With: Other (comment) (grandson lives with her)  Type of Home: House  Home Layout: One level  Home Equipment: Rolling walker        Receives Help From: Family        Ambulation Assistance: Independent  Transfer Assistance: Independent    Active : No  Additional Comments: Pt reports that she generally does not use AD. Objective:     RLE AROM: WFL     LLE AROM : WFL        Strength RLE: WFL  Comment: grossly 4/5    Strength LLE: WFL  Comment: grossly 4/5        Sensation  Overall Sensation Status: WFL       Supine to Sit: Unable to assess (up in chair. Expect SBA based on general mobility.)    Transfers  Sit to Stand: Stand by assistance  Stand to sit: Stand by assistance       Ambulation 1  Surface: level tile  Device: Hand-Held Assist  Assistance: Contact guard assistance  Quality of Gait: slower pace, no LOB, narrow base of support  Distance: 50 ft       Exercises:  Comments: Pt was guided through completion of seated marches, long arc quads, ankle pumps, and reclined hip abd/add and quad sets. Each x 10-12 reps for strengthening to improve functional mobility. Activity Tolerance:  Activity Tolerance: Patient Tolerated treatment well    Treatment Initiated: See exercises above and pt ambulated 220 ft with HandHeld Assist and CGA with slower pace, no LOB, narrow base of support      Assessment: Body structures, Functions, Activity limitations: Decreased functional mobility , Decreased endurance  Assessment: Pt is 80 y.o. female that presented with abdominal pain and delusions. Pt found to have diverticulitis and today demonstrated mobility with CGA to SBA for safety. Pt would benefit from continued skilled PT in the [address] [place] [address] setting for further strengthening, balance and gait training. Prognosis: Excellent    Clinical Presentation: Low - Stable and Uncomplicated:      Decision Making:  Moderate Complexity based on
have extra supervision when she returns home initially to be safe. Pt indicated that her daughter was going to help her at home initially. Pt had some tearfulness when she recalled her spouse who had  3 years ago this month. She stated she enjoys being at home and is looking forward to the graduation of her grandson from vocational school this spring. She was very talkative and asked therapist questions about his home and family. She reported she felt confident in her ability to resume her home routine at discharge. Assessment:  Assessment: Pt is close to her baseline and would not need further OT at this time. She will be getting discharged to her home with her Son and grandson helping her as needed. She also has a daughter Tsering Spence is going to be looking after her when she is available. Performance deficits / Impairments: Decreased high-level IADLs  Prognosis: Good  No Skilled OT: At baseline function    Clinical Decision Making: Clinical Decision making was of Low Complexity as the result of analysis of data from a problem focused assessment, a consideration of a limited number of treatment options, no significant comorbidities affecting the plan of care and no modification or assistance required to complete the evaluation. Patient Education:  Patient Education: Purpose of OT; home safety; having extra supervision initially when she returns home. Equipment Recommendations:  Equipment Needed: No    Safety:  Safety Devices in place: Yes  Type of devices: Left in chair, Chair alarm in place, Nurse notified, Patient at risk for falls    Plan:  Times per week: Not applicable  Plan Comment: Pt is being discharged home with family assisting as needed. Specific instructions for Next Treatment: Not applicable    Goals:  Patient goals : \"Go home. \" pt states.     Short term goals  Time Frame for Short term goals: Not appplicable  Long term goals  Time Frame for Long term goals : None secondary to short

## 2018-03-16 LAB
BLOOD CULTURE, ROUTINE: NORMAL
BLOOD CULTURE, ROUTINE: NORMAL

## 2018-03-22 ENCOUNTER — HOSPITAL ENCOUNTER (OUTPATIENT)
Dept: CT IMAGING | Age: 83
Discharge: HOME | DRG: 071 | End: 2018-03-22
Payer: MEDICARE

## 2018-03-22 DIAGNOSIS — I61.9 BRAIN BLEED (HCC): ICD-10-CM

## 2018-03-22 DIAGNOSIS — R41.0 ACUTE CONFUSION: ICD-10-CM

## 2018-03-22 DIAGNOSIS — R51.9 FRONTAL HEADACHE: ICD-10-CM

## 2018-03-22 PROCEDURE — 70450 CT HEAD/BRAIN W/O DYE: CPT

## 2018-03-23 ENCOUNTER — HOSPITAL ENCOUNTER (INPATIENT)
Age: 83
LOS: 5 days | Discharge: NURSING, REHAB OR LONG-TERM CARE | DRG: 071 | End: 2018-03-28
Attending: EMERGENCY MEDICINE | Admitting: INTERNAL MEDICINE
Payer: MEDICARE

## 2018-03-23 ENCOUNTER — APPOINTMENT (OUTPATIENT)
Dept: GENERAL RADIOLOGY | Age: 83
DRG: 071 | End: 2018-03-23
Payer: MEDICARE

## 2018-03-23 DIAGNOSIS — G93.40 ACUTE ENCEPHALOPATHY: Primary | ICD-10-CM

## 2018-03-23 DIAGNOSIS — F32.0 MILD SINGLE CURRENT EPISODE OF MAJOR DEPRESSIVE DISORDER (HCC): ICD-10-CM

## 2018-03-23 DIAGNOSIS — E03.9 ACQUIRED HYPOTHYROIDISM: ICD-10-CM

## 2018-03-23 PROBLEM — R41.82 ALTERED MENTAL STATUS: Status: ACTIVE | Noted: 2018-03-23

## 2018-03-23 PROBLEM — R41.0 DELIRIUM: Status: ACTIVE | Noted: 2018-03-23

## 2018-03-23 LAB
ALBUMIN SERPL-MCNC: 4.1 G/DL (ref 3.5–5.1)
ALP BLD-CCNC: 66 U/L (ref 38–126)
ALT SERPL-CCNC: 23 U/L (ref 11–66)
AMMONIA: 27 UMOL/L (ref 11–60)
AMPHETAMINE+METHAMPHETAMINE URINE SCREEN: NEGATIVE
ANION GAP SERPL CALCULATED.3IONS-SCNC: 15 MEQ/L (ref 8–16)
ANISOCYTOSIS: ABNORMAL
AST SERPL-CCNC: 19 U/L (ref 5–40)
AVERAGE GLUCOSE: 114 MG/DL (ref 70–126)
BACTERIA: ABNORMAL
BARBITURATE QUANTITATIVE URINE: NEGATIVE
BASOPHILS # BLD: 0.9 %
BASOPHILS ABSOLUTE: 0.1 THOU/MM3 (ref 0–0.1)
BENZODIAZEPINE QUANTITATIVE URINE: NEGATIVE
BILIRUB SERPL-MCNC: 0.5 MG/DL (ref 0.3–1.2)
BILIRUBIN URINE: NEGATIVE
BLOOD, URINE: NEGATIVE
BUN BLDV-MCNC: 22 MG/DL (ref 7–22)
C-REACTIVE PROTEIN: 0.06 MG/DL (ref 0–1)
CALCIUM SERPL-MCNC: 9.9 MG/DL (ref 8.5–10.5)
CANNABINOID QUANTITATIVE URINE: NEGATIVE
CASTS: ABNORMAL /LPF
CASTS: ABNORMAL /LPF
CHARACTER, URINE: CLEAR
CHLORIDE BLD-SCNC: 95 MEQ/L (ref 98–111)
CO2: 29 MEQ/L (ref 23–33)
COCAINE METABOLITE QUANTITATIVE URINE: NEGATIVE
COLOR: YELLOW
CREAT SERPL-MCNC: 0.5 MG/DL (ref 0.4–1.2)
CRYSTALS: ABNORMAL
EKG ATRIAL RATE: 86 BPM
EKG P AXIS: 66 DEGREES
EKG P-R INTERVAL: 152 MS
EKG Q-T INTERVAL: 352 MS
EKG QRS DURATION: 94 MS
EKG QTC CALCULATION (BAZETT): 421 MS
EKG R AXIS: -9 DEGREES
EKG T AXIS: 87 DEGREES
EKG VENTRICULAR RATE: 86 BPM
EOSINOPHIL # BLD: 2 %
EOSINOPHILS ABSOLUTE: 0.2 THOU/MM3 (ref 0–0.4)
EPITHELIAL CELLS, UA: ABNORMAL /HPF
GFR SERPL CREATININE-BSD FRML MDRD: > 90 ML/MIN/1.73M2
GLUCOSE BLD-MCNC: 117 MG/DL (ref 70–108)
GLUCOSE BLD-MCNC: 190 MG/DL (ref 70–108)
GLUCOSE BLD-MCNC: 86 MG/DL (ref 70–108)
GLUCOSE, URINE: NEGATIVE MG/DL
HBA1C MFR BLD: 5.8 % (ref 4.4–6.4)
HCT VFR BLD CALC: 38.9 % (ref 37–47)
HEMOGLOBIN: 13 GM/DL (ref 12–16)
INR BLD: 1.05 (ref 0.85–1.13)
KETONES, URINE: NEGATIVE
LEUKOCYTE EST, POC: NEGATIVE
LYMPHOCYTES # BLD: 29.2 %
LYMPHOCYTES ABSOLUTE: 2.7 THOU/MM3 (ref 1–4.8)
MAGNESIUM: 2 MG/DL (ref 1.6–2.4)
MCH RBC QN AUTO: 28.3 PG (ref 27–31)
MCHC RBC AUTO-ENTMCNC: 33.4 GM/DL (ref 33–37)
MCV RBC AUTO: 84.8 FL (ref 81–99)
MISCELLANEOUS LAB TEST RESULT: ABNORMAL
MONOCYTES # BLD: 7.8 %
MONOCYTES ABSOLUTE: 0.7 THOU/MM3 (ref 0.4–1.3)
NITRITE, URINE: NEGATIVE
NUCLEATED RED BLOOD CELLS: 0 /100 WBC
OPIATES, URINE: NEGATIVE
OSMOLALITY CALCULATION: 281.9 MOSMOL/KG (ref 275–300)
OXYCODONE: NEGATIVE
PDW BLD-RTO: 15.8 % (ref 11.5–14.5)
PH UA: 6.5
PHENCYCLIDINE QUANTITATIVE URINE: NEGATIVE
PLATELET # BLD: 333 THOU/MM3 (ref 130–400)
PMV BLD AUTO: 8.1 FL (ref 7.4–10.4)
POTASSIUM REFLEX MAGNESIUM: 4.5 MEQ/L (ref 3.5–5.2)
PRO-BNP: 76.9 PG/ML (ref 0–1800)
PROTEIN UA: ABNORMAL MG/DL
RBC # BLD: 4.58 MILL/MM3 (ref 4.2–5.4)
RBC URINE: ABNORMAL /HPF
RENAL EPITHELIAL, UA: ABNORMAL
SEG NEUTROPHILS: 60.1 %
SEGMENTED NEUTROPHILS ABSOLUTE COUNT: 5.6 THOU/MM3 (ref 1.8–7.7)
SODIUM BLD-SCNC: 139 MEQ/L (ref 135–145)
SPECIFIC GRAVITY UA: 1.02 (ref 1–1.03)
TOTAL PROTEIN: 8 G/DL (ref 6.1–8)
TSH SERPL DL<=0.05 MIU/L-ACNC: 0.98 UIU/ML (ref 0.4–4.2)
UROBILINOGEN, URINE: 0.2 EU/DL
WBC # BLD: 9.3 THOU/MM3 (ref 4.8–10.8)
WBC UA: ABNORMAL /HPF
YEAST: ABNORMAL

## 2018-03-23 PROCEDURE — 99223 1ST HOSP IP/OBS HIGH 75: CPT | Performed by: PSYCHIATRY & NEUROLOGY

## 2018-03-23 PROCEDURE — 93005 ELECTROCARDIOGRAM TRACING: CPT | Performed by: EMERGENCY MEDICINE

## 2018-03-23 PROCEDURE — 80307 DRUG TEST PRSMV CHEM ANLYZR: CPT

## 2018-03-23 PROCEDURE — 83735 ASSAY OF MAGNESIUM: CPT

## 2018-03-23 PROCEDURE — 6370000000 HC RX 637 (ALT 250 FOR IP): Performed by: INTERNAL MEDICINE

## 2018-03-23 PROCEDURE — 93010 ELECTROCARDIOGRAM REPORT: CPT | Performed by: NUCLEAR MEDICINE

## 2018-03-23 PROCEDURE — 6360000002 HC RX W HCPCS: Performed by: INTERNAL MEDICINE

## 2018-03-23 PROCEDURE — 82948 REAGENT STRIP/BLOOD GLUCOSE: CPT

## 2018-03-23 PROCEDURE — 36415 COLL VENOUS BLD VENIPUNCTURE: CPT

## 2018-03-23 PROCEDURE — 81001 URINALYSIS AUTO W/SCOPE: CPT

## 2018-03-23 PROCEDURE — 80053 COMPREHEN METABOLIC PANEL: CPT

## 2018-03-23 PROCEDURE — 84443 ASSAY THYROID STIM HORMONE: CPT

## 2018-03-23 PROCEDURE — 82140 ASSAY OF AMMONIA: CPT

## 2018-03-23 PROCEDURE — 1200000000 HC SEMI PRIVATE

## 2018-03-23 PROCEDURE — 85610 PROTHROMBIN TIME: CPT

## 2018-03-23 PROCEDURE — 95819 EEG AWAKE AND ASLEEP: CPT

## 2018-03-23 PROCEDURE — 83036 HEMOGLOBIN GLYCOSYLATED A1C: CPT

## 2018-03-23 PROCEDURE — 99285 EMERGENCY DEPT VISIT HI MDM: CPT

## 2018-03-23 PROCEDURE — 83880 ASSAY OF NATRIURETIC PEPTIDE: CPT

## 2018-03-23 PROCEDURE — 71045 X-RAY EXAM CHEST 1 VIEW: CPT

## 2018-03-23 PROCEDURE — 86140 C-REACTIVE PROTEIN: CPT

## 2018-03-23 PROCEDURE — 99223 1ST HOSP IP/OBS HIGH 75: CPT | Performed by: INTERNAL MEDICINE

## 2018-03-23 PROCEDURE — 2580000003 HC RX 258: Performed by: INTERNAL MEDICINE

## 2018-03-23 PROCEDURE — 85025 COMPLETE CBC W/AUTO DIFF WBC: CPT

## 2018-03-23 RX ORDER — LEVOTHYROXINE SODIUM 0.05 MG/1
50 TABLET ORAL DAILY
Status: DISCONTINUED | OUTPATIENT
Start: 2018-03-24 | End: 2018-03-28 | Stop reason: HOSPADM

## 2018-03-23 RX ORDER — SODIUM CHLORIDE 0.9 % (FLUSH) 0.9 %
10 SYRINGE (ML) INJECTION PRN
Status: DISCONTINUED | OUTPATIENT
Start: 2018-03-23 | End: 2018-03-28 | Stop reason: HOSPADM

## 2018-03-23 RX ORDER — FOLIC ACID 1 MG/1
1 TABLET ORAL DAILY
Status: DISCONTINUED | OUTPATIENT
Start: 2018-03-23 | End: 2018-03-28 | Stop reason: HOSPADM

## 2018-03-23 RX ORDER — ONDANSETRON 2 MG/ML
4 INJECTION INTRAMUSCULAR; INTRAVENOUS EVERY 6 HOURS PRN
Status: DISCONTINUED | OUTPATIENT
Start: 2018-03-23 | End: 2018-03-23 | Stop reason: RX

## 2018-03-23 RX ORDER — VENLAFAXINE HYDROCHLORIDE 150 MG/1
150 CAPSULE, EXTENDED RELEASE ORAL
Status: DISCONTINUED | OUTPATIENT
Start: 2018-03-24 | End: 2018-03-23

## 2018-03-23 RX ORDER — UREA 10 %
3 LOTION (ML) TOPICAL NIGHTLY
Status: DISCONTINUED | OUTPATIENT
Start: 2018-03-23 | End: 2018-03-28 | Stop reason: HOSPADM

## 2018-03-23 RX ORDER — ONDANSETRON 4 MG/1
4 TABLET, ORALLY DISINTEGRATING ORAL EVERY 6 HOURS PRN
Status: DISCONTINUED | OUTPATIENT
Start: 2018-03-23 | End: 2018-03-28 | Stop reason: HOSPADM

## 2018-03-23 RX ORDER — DEXTROSE MONOHYDRATE 50 MG/ML
100 INJECTION, SOLUTION INTRAVENOUS PRN
Status: DISCONTINUED | OUTPATIENT
Start: 2018-03-23 | End: 2018-03-28 | Stop reason: HOSPADM

## 2018-03-23 RX ORDER — DEXTROSE MONOHYDRATE 25 G/50ML
12.5 INJECTION, SOLUTION INTRAVENOUS PRN
Status: DISCONTINUED | OUTPATIENT
Start: 2018-03-23 | End: 2018-03-28 | Stop reason: HOSPADM

## 2018-03-23 RX ORDER — PRAVASTATIN SODIUM 20 MG
20 TABLET ORAL DAILY
Status: ON HOLD | COMMUNITY
End: 2018-03-28 | Stop reason: HOSPADM

## 2018-03-23 RX ORDER — QUETIAPINE FUMARATE 25 MG/1
25 TABLET, FILM COATED ORAL NIGHTLY
Status: DISCONTINUED | OUTPATIENT
Start: 2018-03-23 | End: 2018-03-26

## 2018-03-23 RX ORDER — NICOTINE POLACRILEX 4 MG
15 LOZENGE BUCCAL PRN
Status: DISCONTINUED | OUTPATIENT
Start: 2018-03-23 | End: 2018-03-28 | Stop reason: HOSPADM

## 2018-03-23 RX ORDER — PRAVASTATIN SODIUM 20 MG
20 TABLET ORAL NIGHTLY
Status: DISCONTINUED | OUTPATIENT
Start: 2018-03-23 | End: 2018-03-28 | Stop reason: HOSPADM

## 2018-03-23 RX ORDER — VENLAFAXINE HYDROCHLORIDE 75 MG/1
75 CAPSULE, EXTENDED RELEASE ORAL
Status: DISCONTINUED | OUTPATIENT
Start: 2018-03-24 | End: 2018-03-24

## 2018-03-23 RX ORDER — ACETAMINOPHEN 325 MG/1
650 TABLET ORAL EVERY 4 HOURS PRN
Status: DISCONTINUED | OUTPATIENT
Start: 2018-03-23 | End: 2018-03-28 | Stop reason: HOSPADM

## 2018-03-23 RX ORDER — DOCUSATE SODIUM 100 MG/1
100 CAPSULE, LIQUID FILLED ORAL 2 TIMES DAILY
Status: DISCONTINUED | OUTPATIENT
Start: 2018-03-23 | End: 2018-03-28 | Stop reason: HOSPADM

## 2018-03-23 RX ORDER — SODIUM CHLORIDE 0.9 % (FLUSH) 0.9 %
10 SYRINGE (ML) INJECTION EVERY 12 HOURS SCHEDULED
Status: DISCONTINUED | OUTPATIENT
Start: 2018-03-23 | End: 2018-03-28 | Stop reason: HOSPADM

## 2018-03-23 RX ADMIN — Medication 3 MG: at 21:13

## 2018-03-23 RX ADMIN — FOLIC ACID 1 MG: 1 TABLET ORAL at 16:43

## 2018-03-23 RX ADMIN — DOCUSATE SODIUM 100 MG: 100 CAPSULE, LIQUID FILLED ORAL at 21:12

## 2018-03-23 RX ADMIN — QUETIAPINE FUMARATE 25 MG: 25 TABLET ORAL at 21:12

## 2018-03-23 RX ADMIN — PRAVASTATIN SODIUM 20 MG: 20 TABLET ORAL at 21:12

## 2018-03-23 RX ADMIN — Medication 10 ML: at 21:12

## 2018-03-23 RX ADMIN — ENOXAPARIN SODIUM 40 MG: 40 INJECTION SUBCUTANEOUS at 16:43

## 2018-03-23 ASSESSMENT — ENCOUNTER SYMPTOMS
ABDOMINAL PAIN: 0
RHINORRHEA: 0
EYE PAIN: 0
SORE THROAT: 0
DIARRHEA: 0
EYE DISCHARGE: 0
VOMITING: 0
BACK PAIN: 0
COUGH: 0
NAUSEA: 0
SHORTNESS OF BREATH: 0
WHEEZING: 0

## 2018-03-23 ASSESSMENT — PAIN DESCRIPTION - PAIN TYPE
TYPE: ACUTE PAIN
TYPE: ACUTE PAIN

## 2018-03-23 ASSESSMENT — PAIN SCALES - GENERAL
PAINLEVEL_OUTOF10: 0
PAINLEVEL_OUTOF10: 0

## 2018-03-24 PROBLEM — F32.A DEPRESSION: Status: ACTIVE | Noted: 2018-03-24

## 2018-03-24 PROBLEM — G93.40 ACUTE ENCEPHALOPATHY: Status: ACTIVE | Noted: 2018-03-23

## 2018-03-24 PROBLEM — F03.90 DEMENTIA (HCC): Status: ACTIVE | Noted: 2018-03-24

## 2018-03-24 LAB
ANION GAP SERPL CALCULATED.3IONS-SCNC: 13 MEQ/L (ref 8–16)
BUN BLDV-MCNC: 20 MG/DL (ref 7–22)
CALCIUM SERPL-MCNC: 9.8 MG/DL (ref 8.5–10.5)
CHLORIDE BLD-SCNC: 96 MEQ/L (ref 98–111)
CO2: 28 MEQ/L (ref 23–33)
CREAT SERPL-MCNC: 0.5 MG/DL (ref 0.4–1.2)
FOLATE: > 20 NG/ML (ref 4.8–24.2)
GFR SERPL CREATININE-BSD FRML MDRD: > 90 ML/MIN/1.73M2
GLUCOSE BLD-MCNC: 105 MG/DL (ref 70–108)
GLUCOSE BLD-MCNC: 114 MG/DL (ref 70–108)
GLUCOSE BLD-MCNC: 118 MG/DL (ref 70–108)
GLUCOSE BLD-MCNC: 125 MG/DL (ref 70–108)
GLUCOSE BLD-MCNC: 166 MG/DL (ref 70–108)
HCT VFR BLD CALC: 38.6 % (ref 37–47)
HEMOGLOBIN: 12.9 GM/DL (ref 12–16)
MCH RBC QN AUTO: 28.5 PG (ref 27–31)
MCHC RBC AUTO-ENTMCNC: 33.5 GM/DL (ref 33–37)
MCV RBC AUTO: 85 FL (ref 81–99)
PDW BLD-RTO: 15.7 % (ref 11.5–14.5)
PLATELET # BLD: 331 THOU/MM3 (ref 130–400)
PMV BLD AUTO: 8.3 FL (ref 7.4–10.4)
POTASSIUM REFLEX MAGNESIUM: 4.4 MEQ/L (ref 3.5–5.2)
RBC # BLD: 4.54 MILL/MM3 (ref 4.2–5.4)
SODIUM BLD-SCNC: 137 MEQ/L (ref 135–145)
VITAMIN B-12: 419 PG/ML (ref 211–911)
WBC # BLD: 9.6 THOU/MM3 (ref 4.8–10.8)

## 2018-03-24 PROCEDURE — 1200000000 HC SEMI PRIVATE

## 2018-03-24 PROCEDURE — 36415 COLL VENOUS BLD VENIPUNCTURE: CPT

## 2018-03-24 PROCEDURE — 6360000002 HC RX W HCPCS: Performed by: INTERNAL MEDICINE

## 2018-03-24 PROCEDURE — G8987 SELF CARE CURRENT STATUS: HCPCS

## 2018-03-24 PROCEDURE — 97166 OT EVAL MOD COMPLEX 45 MIN: CPT

## 2018-03-24 PROCEDURE — 82746 ASSAY OF FOLIC ACID SERUM: CPT

## 2018-03-24 PROCEDURE — 97535 SELF CARE MNGMENT TRAINING: CPT

## 2018-03-24 PROCEDURE — G8988 SELF CARE GOAL STATUS: HCPCS

## 2018-03-24 PROCEDURE — 82607 VITAMIN B-12: CPT

## 2018-03-24 PROCEDURE — 2580000003 HC RX 258: Performed by: INTERNAL MEDICINE

## 2018-03-24 PROCEDURE — 6370000000 HC RX 637 (ALT 250 FOR IP): Performed by: INTERNAL MEDICINE

## 2018-03-24 PROCEDURE — 85027 COMPLETE CBC AUTOMATED: CPT

## 2018-03-24 PROCEDURE — 80048 BASIC METABOLIC PNL TOTAL CA: CPT

## 2018-03-24 PROCEDURE — 99232 SBSQ HOSP IP/OBS MODERATE 35: CPT | Performed by: PSYCHIATRY & NEUROLOGY

## 2018-03-24 PROCEDURE — 6370000000 HC RX 637 (ALT 250 FOR IP): Performed by: PSYCHIATRY & NEUROLOGY

## 2018-03-24 PROCEDURE — 82948 REAGENT STRIP/BLOOD GLUCOSE: CPT

## 2018-03-24 PROCEDURE — 90791 PSYCH DIAGNOSTIC EVALUATION: CPT | Performed by: PSYCHIATRY & NEUROLOGY

## 2018-03-24 RX ORDER — VENLAFAXINE HYDROCHLORIDE 37.5 MG/1
37.5 CAPSULE, EXTENDED RELEASE ORAL
Status: DISCONTINUED | OUTPATIENT
Start: 2018-03-25 | End: 2018-03-28 | Stop reason: HOSPADM

## 2018-03-24 RX ADMIN — FOLIC ACID 1 MG: 1 TABLET ORAL at 07:51

## 2018-03-24 RX ADMIN — PRAVASTATIN SODIUM 20 MG: 20 TABLET ORAL at 21:42

## 2018-03-24 RX ADMIN — QUETIAPINE FUMARATE 25 MG: 25 TABLET ORAL at 18:26

## 2018-03-24 RX ADMIN — Medication 10 ML: at 07:51

## 2018-03-24 RX ADMIN — LEVOTHYROXINE SODIUM 50 MCG: 50 TABLET ORAL at 07:51

## 2018-03-24 RX ADMIN — ONDANSETRON 4 MG: 4 TABLET, ORALLY DISINTEGRATING ORAL at 16:20

## 2018-03-24 RX ADMIN — VENLAFAXINE HYDROCHLORIDE 75 MG: 75 CAPSULE, EXTENDED RELEASE ORAL at 07:51

## 2018-03-24 RX ADMIN — Medication 3 MG: at 21:42

## 2018-03-24 RX ADMIN — Medication 10 ML: at 21:43

## 2018-03-24 RX ADMIN — ENOXAPARIN SODIUM 40 MG: 40 INJECTION SUBCUTANEOUS at 17:16

## 2018-03-24 RX ADMIN — DOCUSATE SODIUM 100 MG: 100 CAPSULE, LIQUID FILLED ORAL at 07:52

## 2018-03-24 RX ADMIN — DOCUSATE SODIUM 100 MG: 100 CAPSULE, LIQUID FILLED ORAL at 21:42

## 2018-03-24 ASSESSMENT — PAIN SCALES - GENERAL: PAINLEVEL_OUTOF10: 0

## 2018-03-25 LAB
GLUCOSE BLD-MCNC: 107 MG/DL (ref 70–108)
GLUCOSE BLD-MCNC: 131 MG/DL (ref 70–108)
GLUCOSE BLD-MCNC: 145 MG/DL (ref 70–108)
GLUCOSE BLD-MCNC: 208 MG/DL (ref 70–108)

## 2018-03-25 PROCEDURE — 6360000002 HC RX W HCPCS: Performed by: INTERNAL MEDICINE

## 2018-03-25 PROCEDURE — 99232 SBSQ HOSP IP/OBS MODERATE 35: CPT | Performed by: HOSPITALIST

## 2018-03-25 PROCEDURE — 6370000000 HC RX 637 (ALT 250 FOR IP): Performed by: PSYCHIATRY & NEUROLOGY

## 2018-03-25 PROCEDURE — 2580000003 HC RX 258: Performed by: INTERNAL MEDICINE

## 2018-03-25 PROCEDURE — 82948 REAGENT STRIP/BLOOD GLUCOSE: CPT

## 2018-03-25 PROCEDURE — 97162 PT EVAL MOD COMPLEX 30 MIN: CPT

## 2018-03-25 PROCEDURE — G8978 MOBILITY CURRENT STATUS: HCPCS

## 2018-03-25 PROCEDURE — 1200000000 HC SEMI PRIVATE

## 2018-03-25 PROCEDURE — 99232 SBSQ HOSP IP/OBS MODERATE 35: CPT | Performed by: PSYCHIATRY & NEUROLOGY

## 2018-03-25 PROCEDURE — 6370000000 HC RX 637 (ALT 250 FOR IP): Performed by: INTERNAL MEDICINE

## 2018-03-25 PROCEDURE — G8979 MOBILITY GOAL STATUS: HCPCS

## 2018-03-25 RX ORDER — MORPHINE SULFATE 4 MG/ML
4 INJECTION, SOLUTION INTRAMUSCULAR; INTRAVENOUS ONCE
Status: COMPLETED | OUTPATIENT
Start: 2018-03-25 | End: 2018-03-25

## 2018-03-25 RX ADMIN — LEVOTHYROXINE SODIUM 50 MCG: 50 TABLET ORAL at 04:48

## 2018-03-25 RX ADMIN — ENOXAPARIN SODIUM 40 MG: 40 INJECTION SUBCUTANEOUS at 16:06

## 2018-03-25 RX ADMIN — FOLIC ACID 1 MG: 1 TABLET ORAL at 07:43

## 2018-03-25 RX ADMIN — MORPHINE SULFATE 4 MG: 4 INJECTION INTRAVENOUS at 21:46

## 2018-03-25 RX ADMIN — Medication 10 ML: at 20:25

## 2018-03-25 RX ADMIN — DOCUSATE SODIUM 100 MG: 100 CAPSULE, LIQUID FILLED ORAL at 20:25

## 2018-03-25 RX ADMIN — Medication 1 UNITS: at 12:38

## 2018-03-25 RX ADMIN — INSULIN LISPRO 1 UNITS: 100 INJECTION, SOLUTION INTRAVENOUS; SUBCUTANEOUS at 20:29

## 2018-03-25 RX ADMIN — PRAVASTATIN SODIUM 20 MG: 20 TABLET ORAL at 20:25

## 2018-03-25 RX ADMIN — QUETIAPINE FUMARATE 25 MG: 25 TABLET ORAL at 20:25

## 2018-03-25 RX ADMIN — DOCUSATE SODIUM 100 MG: 100 CAPSULE, LIQUID FILLED ORAL at 07:43

## 2018-03-25 RX ADMIN — VENLAFAXINE HYDROCHLORIDE 37.5 MG: 37.5 CAPSULE, EXTENDED RELEASE ORAL at 07:43

## 2018-03-25 RX ADMIN — Medication 10 ML: at 10:41

## 2018-03-25 RX ADMIN — Medication 3 MG: at 20:25

## 2018-03-25 ASSESSMENT — PAIN SCALES - GENERAL
PAINLEVEL_OUTOF10: 6
PAINLEVEL_OUTOF10: 0

## 2018-03-26 LAB
GLUCOSE BLD-MCNC: 126 MG/DL (ref 70–108)
GLUCOSE BLD-MCNC: 132 MG/DL (ref 70–108)
GLUCOSE BLD-MCNC: 151 MG/DL (ref 70–108)
GLUCOSE BLD-MCNC: 168 MG/DL (ref 70–108)

## 2018-03-26 PROCEDURE — 6360000002 HC RX W HCPCS: Performed by: INTERNAL MEDICINE

## 2018-03-26 PROCEDURE — 6370000000 HC RX 637 (ALT 250 FOR IP): Performed by: INTERNAL MEDICINE

## 2018-03-26 PROCEDURE — 6370000000 HC RX 637 (ALT 250 FOR IP): Performed by: HOSPITALIST

## 2018-03-26 PROCEDURE — 6370000000 HC RX 637 (ALT 250 FOR IP): Performed by: PSYCHIATRY & NEUROLOGY

## 2018-03-26 PROCEDURE — 82948 REAGENT STRIP/BLOOD GLUCOSE: CPT

## 2018-03-26 PROCEDURE — 99232 SBSQ HOSP IP/OBS MODERATE 35: CPT | Performed by: HOSPITALIST

## 2018-03-26 PROCEDURE — 92523 SPEECH SOUND LANG COMPREHEN: CPT

## 2018-03-26 PROCEDURE — 97116 GAIT TRAINING THERAPY: CPT

## 2018-03-26 PROCEDURE — 97110 THERAPEUTIC EXERCISES: CPT

## 2018-03-26 PROCEDURE — 1200000000 HC SEMI PRIVATE

## 2018-03-26 PROCEDURE — 99231 SBSQ HOSP IP/OBS SF/LOW 25: CPT | Performed by: PHYSICIAN ASSISTANT

## 2018-03-26 PROCEDURE — 2580000003 HC RX 258: Performed by: INTERNAL MEDICINE

## 2018-03-26 RX ORDER — QUETIAPINE FUMARATE 25 MG/1
25 TABLET, FILM COATED ORAL 2 TIMES DAILY
Status: DISCONTINUED | OUTPATIENT
Start: 2018-03-26 | End: 2018-03-28 | Stop reason: HOSPADM

## 2018-03-26 RX ADMIN — Medication 3 MG: at 20:31

## 2018-03-26 RX ADMIN — VENLAFAXINE HYDROCHLORIDE 37.5 MG: 37.5 CAPSULE, EXTENDED RELEASE ORAL at 09:10

## 2018-03-26 RX ADMIN — ENOXAPARIN SODIUM 40 MG: 40 INJECTION SUBCUTANEOUS at 16:44

## 2018-03-26 RX ADMIN — Medication 1 UNITS: at 11:41

## 2018-03-26 RX ADMIN — Medication 10 ML: at 20:32

## 2018-03-26 RX ADMIN — QUETIAPINE FUMARATE 25 MG: 25 TABLET ORAL at 09:10

## 2018-03-26 RX ADMIN — Medication 10 ML: at 09:22

## 2018-03-26 RX ADMIN — LEVOTHYROXINE SODIUM 50 MCG: 50 TABLET ORAL at 07:07

## 2018-03-26 RX ADMIN — Medication 1 UNITS: at 09:10

## 2018-03-26 RX ADMIN — DOCUSATE SODIUM 100 MG: 100 CAPSULE, LIQUID FILLED ORAL at 09:09

## 2018-03-26 RX ADMIN — QUETIAPINE FUMARATE 25 MG: 25 TABLET ORAL at 20:31

## 2018-03-26 RX ADMIN — PRAVASTATIN SODIUM 20 MG: 20 TABLET ORAL at 20:31

## 2018-03-26 RX ADMIN — FOLIC ACID 1 MG: 1 TABLET ORAL at 09:10

## 2018-03-26 RX ADMIN — DOCUSATE SODIUM 100 MG: 100 CAPSULE, LIQUID FILLED ORAL at 20:31

## 2018-03-26 ASSESSMENT — PAIN SCALES - GENERAL
PAINLEVEL_OUTOF10: 0

## 2018-03-27 LAB
GLUCOSE BLD-MCNC: 114 MG/DL (ref 70–108)
GLUCOSE BLD-MCNC: 123 MG/DL (ref 70–108)
GLUCOSE BLD-MCNC: 137 MG/DL (ref 70–108)
GLUCOSE BLD-MCNC: 141 MG/DL (ref 70–108)

## 2018-03-27 PROCEDURE — 1200000000 HC SEMI PRIVATE

## 2018-03-27 PROCEDURE — 6370000000 HC RX 637 (ALT 250 FOR IP): Performed by: HOSPITALIST

## 2018-03-27 PROCEDURE — 97110 THERAPEUTIC EXERCISES: CPT

## 2018-03-27 PROCEDURE — 6360000002 HC RX W HCPCS: Performed by: INTERNAL MEDICINE

## 2018-03-27 PROCEDURE — 97116 GAIT TRAINING THERAPY: CPT

## 2018-03-27 PROCEDURE — 6370000000 HC RX 637 (ALT 250 FOR IP): Performed by: PSYCHIATRY & NEUROLOGY

## 2018-03-27 PROCEDURE — 6370000000 HC RX 637 (ALT 250 FOR IP): Performed by: INTERNAL MEDICINE

## 2018-03-27 PROCEDURE — 99232 SBSQ HOSP IP/OBS MODERATE 35: CPT | Performed by: INTERNAL MEDICINE

## 2018-03-27 PROCEDURE — 2580000003 HC RX 258: Performed by: INTERNAL MEDICINE

## 2018-03-27 PROCEDURE — 82948 REAGENT STRIP/BLOOD GLUCOSE: CPT

## 2018-03-27 RX ADMIN — ENOXAPARIN SODIUM 40 MG: 40 INJECTION SUBCUTANEOUS at 15:30

## 2018-03-27 RX ADMIN — QUETIAPINE FUMARATE 25 MG: 25 TABLET ORAL at 08:50

## 2018-03-27 RX ADMIN — Medication 10 ML: at 08:51

## 2018-03-27 RX ADMIN — LEVOTHYROXINE SODIUM 50 MCG: 50 TABLET ORAL at 08:50

## 2018-03-27 RX ADMIN — VENLAFAXINE HYDROCHLORIDE 37.5 MG: 37.5 CAPSULE, EXTENDED RELEASE ORAL at 08:50

## 2018-03-27 RX ADMIN — Medication 1 UNITS: at 12:46

## 2018-03-27 RX ADMIN — Medication 3 MG: at 20:19

## 2018-03-27 RX ADMIN — PRAVASTATIN SODIUM 20 MG: 20 TABLET ORAL at 20:20

## 2018-03-27 RX ADMIN — MAGNESIUM HYDROXIDE 30 ML: 400 SUSPENSION ORAL at 15:30

## 2018-03-27 RX ADMIN — Medication 10 ML: at 20:24

## 2018-03-27 RX ADMIN — DOCUSATE SODIUM 100 MG: 100 CAPSULE, LIQUID FILLED ORAL at 08:50

## 2018-03-27 RX ADMIN — ACETAMINOPHEN 650 MG: 325 TABLET ORAL at 13:17

## 2018-03-27 RX ADMIN — DOCUSATE SODIUM 100 MG: 100 CAPSULE, LIQUID FILLED ORAL at 20:20

## 2018-03-27 RX ADMIN — QUETIAPINE FUMARATE 25 MG: 25 TABLET ORAL at 20:20

## 2018-03-27 RX ADMIN — FOLIC ACID 1 MG: 1 TABLET ORAL at 08:50

## 2018-03-27 RX ADMIN — ACETAMINOPHEN 650 MG: 325 TABLET ORAL at 08:22

## 2018-03-27 ASSESSMENT — PAIN SCALES - GENERAL
PAINLEVEL_OUTOF10: 0
PAINLEVEL_OUTOF10: 9
PAINLEVEL_OUTOF10: 10
PAINLEVEL_OUTOF10: 0
PAINLEVEL_OUTOF10: 10
PAINLEVEL_OUTOF10: 0
PAINLEVEL_OUTOF10: 8

## 2018-03-27 ASSESSMENT — PAIN DESCRIPTION - PAIN TYPE
TYPE: ACUTE PAIN

## 2018-03-27 ASSESSMENT — PAIN DESCRIPTION - ONSET
ONSET: ON-GOING
ONSET: GRADUAL

## 2018-03-27 ASSESSMENT — PAIN DESCRIPTION - LOCATION
LOCATION: SHOULDER;NECK
LOCATION: ABDOMEN;CHEST;NECK
LOCATION: ABDOMEN;CHEST;NECK

## 2018-03-27 ASSESSMENT — PAIN DESCRIPTION - DESCRIPTORS
DESCRIPTORS: PATIENT UNABLE TO DESCRIBE
DESCRIPTORS: ACHING;DISCOMFORT
DESCRIPTORS: ACHING;DISCOMFORT

## 2018-03-27 ASSESSMENT — PAIN DESCRIPTION - PROGRESSION: CLINICAL_PROGRESSION: GRADUALLY IMPROVING

## 2018-03-28 VITALS
HEIGHT: 61 IN | WEIGHT: 134.5 LBS | DIASTOLIC BLOOD PRESSURE: 64 MMHG | TEMPERATURE: 98.4 F | RESPIRATION RATE: 22 BRPM | OXYGEN SATURATION: 95 % | SYSTOLIC BLOOD PRESSURE: 135 MMHG | BODY MASS INDEX: 25.39 KG/M2 | HEART RATE: 70 BPM

## 2018-03-28 LAB
GLUCOSE BLD-MCNC: 134 MG/DL (ref 70–108)
GLUCOSE BLD-MCNC: 137 MG/DL (ref 70–108)

## 2018-03-28 PROCEDURE — 97110 THERAPEUTIC EXERCISES: CPT

## 2018-03-28 PROCEDURE — 6370000000 HC RX 637 (ALT 250 FOR IP): Performed by: HOSPITALIST

## 2018-03-28 PROCEDURE — 6370000000 HC RX 637 (ALT 250 FOR IP): Performed by: NURSE PRACTITIONER

## 2018-03-28 PROCEDURE — 82948 REAGENT STRIP/BLOOD GLUCOSE: CPT

## 2018-03-28 PROCEDURE — 6370000000 HC RX 637 (ALT 250 FOR IP): Performed by: INTERNAL MEDICINE

## 2018-03-28 PROCEDURE — 97535 SELF CARE MNGMENT TRAINING: CPT

## 2018-03-28 PROCEDURE — 6370000000 HC RX 637 (ALT 250 FOR IP): Performed by: PSYCHIATRY & NEUROLOGY

## 2018-03-28 PROCEDURE — G0515 COGNITIVE SKILLS DEVELOPMENT: HCPCS

## 2018-03-28 PROCEDURE — 99238 HOSP IP/OBS DSCHRG MGMT 30/<: CPT | Performed by: INTERNAL MEDICINE

## 2018-03-28 RX ORDER — LORAZEPAM 0.5 MG/1
0.25 TABLET ORAL ONCE
Status: COMPLETED | OUTPATIENT
Start: 2018-03-28 | End: 2018-03-28

## 2018-03-28 RX ORDER — QUETIAPINE FUMARATE 25 MG/1
25 TABLET, FILM COATED ORAL 2 TIMES DAILY
Qty: 60 TABLET | Refills: 0 | DISCHARGE
Start: 2018-03-28

## 2018-03-28 RX ORDER — ACETAMINOPHEN 325 MG/1
650 TABLET ORAL EVERY 4 HOURS PRN
Qty: 120 TABLET | Refills: 3 | DISCHARGE
Start: 2018-03-28

## 2018-03-28 RX ORDER — VENLAFAXINE HYDROCHLORIDE 37.5 MG/1
37.5 CAPSULE, EXTENDED RELEASE ORAL
Qty: 30 CAPSULE | Refills: 3 | DISCHARGE
Start: 2018-03-29

## 2018-03-28 RX ORDER — UREA 10 %
3 LOTION (ML) TOPICAL NIGHTLY
DISCHARGE
Start: 2018-03-28

## 2018-03-28 RX ADMIN — QUETIAPINE FUMARATE 25 MG: 25 TABLET ORAL at 09:02

## 2018-03-28 RX ADMIN — FOLIC ACID 1 MG: 1 TABLET ORAL at 09:02

## 2018-03-28 RX ADMIN — DOCUSATE SODIUM 100 MG: 100 CAPSULE, LIQUID FILLED ORAL at 09:02

## 2018-03-28 RX ADMIN — LEVOTHYROXINE SODIUM 50 MCG: 50 TABLET ORAL at 05:06

## 2018-03-28 RX ADMIN — VENLAFAXINE HYDROCHLORIDE 37.5 MG: 37.5 CAPSULE, EXTENDED RELEASE ORAL at 09:02

## 2018-03-28 RX ADMIN — LORAZEPAM 0.25 MG: 0.5 TABLET ORAL at 04:03

## 2018-03-28 RX ADMIN — ACETAMINOPHEN 650 MG: 325 TABLET ORAL at 09:02

## 2018-03-28 ASSESSMENT — PAIN DESCRIPTION - DESCRIPTORS: DESCRIPTORS: ACHING;DISCOMFORT

## 2018-03-28 ASSESSMENT — PAIN SCALES - GENERAL
PAINLEVEL_OUTOF10: 0
PAINLEVEL_OUTOF10: 5
PAINLEVEL_OUTOF10: 5

## 2018-03-28 ASSESSMENT — PAIN DESCRIPTION - LOCATION: LOCATION: BACK

## 2018-03-28 ASSESSMENT — PAIN DESCRIPTION - PAIN TYPE: TYPE: ACUTE PAIN

## 2019-10-01 ENCOUNTER — HOSPITAL ENCOUNTER (OUTPATIENT)
Dept: WOMENS IMAGING | Age: 84
Discharge: HOME | End: 2019-10-01
Payer: MEDICARE

## 2019-10-01 DIAGNOSIS — Z12.39 BREAST SCREENING: ICD-10-CM

## 2019-10-01 PROCEDURE — 77063 BREAST TOMOSYNTHESIS BI: CPT

## 2020-07-26 NOTE — PROGRESS NOTES
[facility] initial consultation note    Blaire Tapia                                             Chief Complaint: Javier Carrillo is a new sleep consult, no prior studies     Chief complaint: Blaire Tapia is a 80 y. o.oldfemale came for further evaluation regarding her ?sleep apnea  with referral from self referral.    [place]:    Sleep/Wake schedule:  Usual time to go to bed during the work/regular day of week: 10:00 PM.  Usual time to wake up during the work//regular day of week: 6:00 to 7:00 AM.  Over the weekends her sleep schedule: [x] Remain same. She usually falls a sleep in less than: Some times she goes to bed with in 10minutes and some time she takes up to 2 to 3hours. She takes naps: No.      Sleep Hygiene:  Is the temperature and evironment in her bed room is acceptable to her: Yes. She watches Television in her bed room: No.  She read books, study, pay bills etc in the bed: No.  She had macular degeneration but she still can attend her personal needs including going to rest room. Frequency She wake up during night/sleep: 1. Majority of nocturnal awakenings are for urination: Yes. Difficulty in falling back to sleep after nocturnal awakenings: No  .  Do you drink coffee: No.   Do you drink caffeinated beverages i.e sodas: No.   Do you drink tea:No.      Do you drink alcoholic beverages: No.  History of recreational drug use: No.     History of tobacco smoking:No.      Sleep apnea symptoms:  Noticed to have loud snoring:Yes. Noted by her family member- daughter  Witnessed apneas during sleep noticed: No.  History of choking and gasping sensation at night time: No.  History of headaches in the morning:No.  History of dry mouth in the morning: Yes. History of palpitations during night time/nocturnal awakenings: Yes.   History of sweating during night time/nocturnal awakenings: Yes- some times    General:  History of head injury in the past: Yes.   [x] Not due to MVA. She had a hx of concussion injury to the head 8years back due to a fall on ice. She never had surgery on her head. Associated loss of consciousness with head injury: No.  History of seizures: No.   Rest less legs syndrome symptoms:NO  History suggestive of periodic limb movements during sleep: NO  History suggestive of hypnagogic hallucinations: NO  History suggestive of hypnopompic hallucinations: NO  History suggestive of sleep talking:NO  History suggestive of sleep walking:NO  History suggestive of bruxism: No    History suggestive of cataplexy: NO  History suggestive of sleep paralysis: NO    Family history of sleep disorders:  Family history of obstructive sleep apnea: NO.  Family history of Narcolepsy: NO.  Family history of Rest less legs syndrome : NO.      History regarding old sleep studies:  Prior history of sleep study: No.  Using CPAP device: No.  Currently using home Oxygen: NO.       Patient considerations:  Is the patient is ambulatory: Yes  Patient is currently using: None of these Wheelchair, Lysbeth Lemmings or Chavez Score. Para/Quadriplegic: NO  Hearing deficit : NO  Claustrophobic: NO  MDD : NO  Blind: NO  Incontinent: NO  Para/Quadraplegi: NO.   Need transportation to and from Sleep Center:NO      Social History:  Social History     Tobacco Use    Smoking status: Never Smoker    Smokeless tobacco: Never Used   Substance Use Topics    Alcohol use: No    Drug use: No   .  She is currently working: No.       [x] Retired. 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 Her  used to be a rosa. Her   5 years back.                          Past Medical History:   Diagnosis Date    Depression     Diabetes mellitus ([facility] [address]     Diverticulitis     Hx of blood clots     Hyperlipidemia     Macular degeneration     Thyroid disease        Past Surgical History:   Procedure Laterality Date    COLONOSCOPY      ENDOSCOPY, COLON, DIAGNOSTIC      EYE SURGERY cataracts    HYSTERECTOMY      JOINT REPLACEMENT      left hip/rightknee    VASCULAR SURGERY      vein stripping 1972       Allergies   Allergen Reactions    Ciprofloxacin Other (See Comments)     Possible intolerance --altered mental status    Flagyl [Metronidazole] Other (See Comments)     Possible intolerance--altered mental status    Pcn [Penicillins]      hives       Current Outpatient Medications   Medication Sig Dispense Refill    aspirin 81 MG EC tablet Take 81 mg by mouth daily      Multiple Vitamins-Minerals (PRESERVISION AREDS 2 PO) Take by mouth      QUEtiapine (SEROQUEL XR) 50 MG extended release tablet Take 50 mg by mouth nightly      polyethylene glycol (GLYCOLAX) 17 g packet Take 17 g by mouth daily as needed for Constipation      LORazepam (ATIVAN) 0.5 MG tablet Take 0.5 mg by mouth every 6 hours as needed for Anxiety.  lamoTRIgine (LAMICTAL) 200 MG tablet Take 200 mg by mouth daily      melatonin 1 MG tablet Take 3 tablets by mouth nightly      venlafaxine (EFFEXOR XR) 37.5 MG extended release capsule Take 1 capsule by mouth daily (with breakfast) (Patient taking differently: Take 75 mg by mouth daily (with breakfast) ) 30 capsule 3    docusate sodium (COLACE, DULCOLAX) 100 MG CAPS Take 100 mg by mouth 2 times daily 30 capsule 0    MISC NATURAL PRODUCTS PO Take by mouth Preservations areds twice per day      MISC NATURAL PRODUCTS PO Take by mouth Premier formula for ocular nutrition twice daily      folic acid (FOLVITE) 1 MG tablet Take 1 tablet by mouth daily.  levothyroxine (SYNTHROID) 50 MCG tablet Take 50 mcg by mouth Daily.  metformin (GLUCOPHAGE) 500 MG tablet Take 500 mg by mouth 2 times daily (with meals).       acetaminophen (TYLENOL) 325 MG tablet Take 2 tablets by mouth every 4 hours as needed for Pain or Fever (Patient not taking: Reported on 7/29/2020) 120 tablet 3    QUEtiapine (SEROQUEL) 25 MG tablet Take 1 tablet by mouth 2 times daily 60 tablet 0     No current facility-administered medications for this visit. Family History   Problem Relation Age of Onset    Asthma Mother         hayfever    Breast Cancer Mother     Cancer Mother         stomach    Hearing Loss Father     Vision Loss Father     Cancer Sister         ovarian or uterine and lung    Birth Defects Brother     Early Death Brother     Vision Loss Brother     Diabetes Other    Marjean Pardon / Stillbirths Daughter     Alcohol Abuse Neg Hx     Allergy (Severe) Neg Hx     Anemia Neg Hx     Arthritis Neg Hx     Arrhythmia Neg Hx     Atrial Fibrillation Neg Hx     Coronary Art Dis Neg Hx     Colon Cancer Neg Hx     Depression Neg Hx     Heart Attack Neg Hx     Heart Disease Neg Hx     High Blood Pressure Neg Hx     High Cholesterol Neg Hx     Kidney Disease Neg Hx     Learning Disabilities Neg Hx     Mental Illness Neg Hx     Prostate Cancer Neg Hx     Obesity Neg Hx     Stroke Neg Hx     Substance Abuse Neg Hx     Osteoporosis Neg Hx         Review of Systems:   General/Constitutional: No recent loss of weight or appetite changes. No fever or chills. HENT: Negative. Eyes: Negative. Upper respiratory tract: Occasional nasal stuffiness with no post nasal drip. Lower respiratory tract/ lungs: No cough or sputum production. No hemoptysis. Cardiovascular: No palpitations or chest pain. Gastrointestinal: No nausea or vomiting. Neurological: No focal neurologiacal weakness. Extremities: No edema. Musculoskeletal: No complaints. Genitourinary: No complaints. Hematological: Negative. Psychiatric/Behavioral: Negative. Skin: No itching. /68 (Site: Left Upper Arm, Position: Sitting, Cuff Size: Medium Adult)   Pulse 78   Ht 5' 1\" (1.549 m)   Wt 144 lb 3.2 oz (65.4 kg)   SpO2 96% Comment: room air at rest  BMI 27.25 kg/m²   Mallampati airway Class:4  Neck Circumference:15.  Inches  [place] sleepiness score 7/29/20: 5  Saqli:.84       Physical Exam   Nursing note and vitals reviewed. Constitutional: Patient appears moderately built and moderately nourished. No distress. Patient is oriented to person, place, and time. HENT:   Head: Normocephalic and atraumatic. Right Ear: External ear normal.   Left Ear: External ear normal.   Mouth/Throat: Oropharynx is clear and moist.  No oral thrush. Eyes: Conjunctivae are normal. Pupils are equal, round, and reactive to light. No scleral icterus. Neck: Neck supple. No JVD present. No tracheal deviation present. Cardiovascular: Normal rate, regular rhythm, normal heart sounds. No murmur heard. Pulmonary/Chest: Effort normal and breath sounds normal. No stridor. No respiratory distress. No wheezes. No rales. Patient exhibits no tenderness. Abdominal: Soft. Patient exhibits no distension. No tenderness. Musculoskeletal: Normal range of motion. She was noted to have intentional tremor in her right upper extremity. Extremities: Patient exhibits no edema and no tenderness. Lymphadenopathy:  No cervical adenopathy. Neurological: Patient is alert and oriented to person, place, and time. Skin: Skin is warm and dry. Patient is not diaphoretic. Psychiatric: Flattened affect. Patient behavior is normal.     Diagnostic Data:  None related sleep. Assessment:  -Snoring without witnessed apneas, nocturnal awakening- need to evaluate for obstructive sleep apnea. -Inadequate sleep hygiene. -Depression on multiple meds-She follows with Ms. Martita Manrique- psychiatrist in [facility].  -Diabetes mellitus ([facility] [address] -Macular degeneration.  -Hypothyroidism on supplementation. -? Dementia Vs Parkinson's disease. Recommendations/Plan:  -Will schedule patient for polysomnogram in the sleep lab with REM behavior montage.  -At the request of patient due to difficulty in sleeping in strange places ( including sleep lab), she was given a sleep aid i.e Ambien 5mg PO Qhs prn X1 to use on the day of sleep study.

## 2020-07-29 ENCOUNTER — INITIAL CONSULT (OUTPATIENT)
Dept: PULMONOLOGY | Age: 85
End: 2020-07-29
Payer: MEDICARE

## 2020-07-29 VITALS
DIASTOLIC BLOOD PRESSURE: 68 MMHG | SYSTOLIC BLOOD PRESSURE: 118 MMHG | OXYGEN SATURATION: 96 % | BODY MASS INDEX: 27.23 KG/M2 | WEIGHT: 144.2 LBS | HEART RATE: 78 BPM | HEIGHT: 61 IN

## 2020-07-29 PROCEDURE — G8400 PT W/DXA NO RESULTS DOC: HCPCS | Performed by: INTERNAL MEDICINE

## 2020-07-29 PROCEDURE — G8419 CALC BMI OUT NRM PARAM NOF/U: HCPCS | Performed by: INTERNAL MEDICINE

## 2020-07-29 PROCEDURE — 4040F PNEUMOC VAC/ADMIN/RCVD: CPT | Performed by: INTERNAL MEDICINE

## 2020-07-29 PROCEDURE — 1090F PRES/ABSN URINE INCON ASSESS: CPT | Performed by: INTERNAL MEDICINE

## 2020-07-29 PROCEDURE — 1123F ACP DISCUSS/DSCN MKR DOCD: CPT | Performed by: INTERNAL MEDICINE

## 2020-07-29 PROCEDURE — G8427 DOCREV CUR MEDS BY ELIG CLIN: HCPCS | Performed by: INTERNAL MEDICINE

## 2020-07-29 PROCEDURE — 99204 OFFICE O/P NEW MOD 45 MIN: CPT | Performed by: INTERNAL MEDICINE

## 2020-07-29 PROCEDURE — 1036F TOBACCO NON-USER: CPT | Performed by: INTERNAL MEDICINE

## 2020-07-29 RX ORDER — ZOLPIDEM TARTRATE 5 MG/1
5 TABLET ORAL NIGHTLY PRN
Qty: 1 TABLET | Refills: 0 | Status: SHIPPED | OUTPATIENT
Start: 2020-07-29 | End: 2020-07-30

## 2020-07-29 RX ORDER — QUETIAPINE FUMARATE 50 MG/1
50 TABLET, EXTENDED RELEASE ORAL NIGHTLY
COMMUNITY

## 2020-07-29 RX ORDER — LAMOTRIGINE 200 MG/1
200 TABLET ORAL DAILY
COMMUNITY

## 2020-07-29 RX ORDER — POLYETHYLENE GLYCOL 3350 17 G/17G
17 POWDER, FOR SOLUTION ORAL DAILY PRN
COMMUNITY

## 2020-07-29 RX ORDER — LORAZEPAM 0.5 MG/1
0.5 TABLET ORAL EVERY 6 HOURS PRN
COMMUNITY
End: 2020-07-29

## 2020-07-29 RX ORDER — ASPIRIN 81 MG/1
81 TABLET ORAL DAILY
COMMUNITY

## 2020-07-29 NOTE — PROGRESS NOTES
Chief Complaint: Carroll Joel is a new sleep consult, no prior studies    Mallampati airway Class:4Neck Circumference:15.  Inches    [place] sleepiness score 7/29/20: 5  Saqli:.84           :

## 2020-07-29 NOTE — PATIENT INSTRUCTIONS
Recommendations/Plan:  -Will schedule patient for polysomnogram in the sleep lab with REM behavior montage.  -At the request of patient due to difficulty in sleeping in strange places ( including sleep lab), she was given a sleep aid i.e Ambien 5mg PO Qhs prn X1 to use on the day of sleep study. She was instructed to take Ambien only if needed on the day of test. She verbalizes understanding.  -She was advised to stop taking Melatonin 1mg po Qhs by her Psychiatrist [facility], [facility]  -I had a discussion with patient regarding avialable treatment options for her sleep disorder breathing including but not limited to CPAP titration in the sleep lab Vs.Dental appliance placement with referral to a local dentist Vs other available surgical options including Uvulopalatopharyngoplasty, maxillomandibular ostomy and tracheostomy as last option. At the end of discussion, she is not decided on her   treatment if she found to have obstructive sleep apnea at this time.  -We will see Andreina Grantg back in 1week after the sleep study to go over the sleep study results and further management options.  -She was educated to practice good sleep hygiene practices. She  was provided with a good sleep hygiene hand out. -She was educated about sleep restriction therapy and advised to practice to improve her insomnia. -She was educated about stimulus control therapy and advise to practice to improve her insomnia. -She was offered a referral to a Neurologist for further evaluation for ? Dementia Vs Parkinson's disease. How ever patient and family refused. -Balbina Martini was advised to make earlier appointment with my clinic if she develops any worsening of sleep symptoms. She verbalizes understanding.  -Balbina Martini was advised to not to drive any motor vehicles or operate heavy equipment until her sleep symptoms are under good control. Andreina Costello verbalizes understanding.  -She was advised to loose weight by controlling diet and doing exercise once cleared by her family physician. - Dina Moore and her family including her daughter and son- in - law were educated about my impression and plan. They verbalizes understanding.

## 2020-08-10 ENCOUNTER — OUTSIDE SERVICES (OUTPATIENT)
Dept: FAMILY MEDICINE CLINIC | Age: 85
End: 2020-08-10
Payer: MEDICARE

## 2020-08-10 VITALS
SYSTOLIC BLOOD PRESSURE: 135 MMHG | WEIGHT: 143.4 LBS | TEMPERATURE: 97.9 F | OXYGEN SATURATION: 95 % | HEART RATE: 75 BPM | BODY MASS INDEX: 27.1 KG/M2 | DIASTOLIC BLOOD PRESSURE: 66 MMHG | RESPIRATION RATE: 18 BRPM

## 2020-08-10 PROCEDURE — 99305 1ST NF CARE MODERATE MDM 35: CPT | Performed by: NURSE PRACTITIONER

## 2020-08-10 ASSESSMENT — ENCOUNTER SYMPTOMS
VOMITING: 0
NAUSEA: 0
RHINORRHEA: 0
EYE DISCHARGE: 0
EYE PAIN: 0
ABDOMINAL PAIN: 0
SORE THROAT: 0
COUGH: 0
SHORTNESS OF BREATH: 0
COLOR CHANGE: 0

## 2020-08-10 NOTE — PROGRESS NOTES
Radha Leung is a 80 y.o. female who presents today for her medical conditions/complaints as noted below. Chief Complaint   Patient presents with    Other     admission           HPI:     Scott Vazquez is seen today for an admission to the facility. She was admitted on 8/8/2020. She has a PMX of dementia, frequent falls, DM II, depression, anxiety, HLD, hypothyroid, among others. She was living with her daughter and fell hitting her head on a table, and her daughter found her. She was taken to the ER at [facility] [place] for eval and was an emergency admission here due to increasing confusion and falls at home. She has had confusion off and on for over 2 years. She was admitted to [facility] 2 years ago for similar episode and admitted to SNF at that time. She improved, went to AL and then eventually to her daughter's home. On exam, she denies pain, shortness of breath, or cough. She is oriented to person only. She is tearful and then agitated. She was on Seroquel 25 mg 2 years ago at [facility], she is now on 100 mg qhs. Per staff, she is very anxious at times and walks in the arango. She remains on isolation due to new admission. She does know she is at a nursing home, but she does not know which one. No injuries are noted from recent fall.       Past Medical History:   Diagnosis Date    Depression     Diabetes mellitus ([address]     Diverticulitis     Hx of blood clots     Hyperlipidemia     Macular degeneration     Thyroid disease       Past Surgical History:   Procedure Laterality Date    COLONOSCOPY      ENDOSCOPY, COLON, DIAGNOSTIC      EYE SURGERY      cataracts    HYSTERECTOMY      JOINT REPLACEMENT      left hip/rightknee    VASCULAR SURGERY      vein stripping 1972       Family History   Problem Relation Age of Onset    Asthma Mother         hayfever    Breast Cancer Mother     Cancer Mother         stomach    Hearing Loss Father     Vision Loss Father     Cancer Sister         ovarian or uterine and lung    Birth Defects Brother     Early Death Brother     Vision Loss Brother     Diabetes Other     Miscarriages / Stillbirths Daughter     Alcohol Abuse Neg Hx     Allergy (Severe) Neg Hx     Anemia Neg Hx     Arthritis Neg Hx     Arrhythmia Neg Hx     Atrial Fibrillation Neg Hx     Coronary Art Dis Neg Hx     Colon Cancer Neg Hx     Depression Neg Hx     Heart Attack Neg Hx     Heart Disease Neg Hx     High Blood Pressure Neg Hx     High Cholesterol Neg Hx     Kidney Disease Neg Hx     Learning Disabilities Neg Hx     Mental Illness Neg Hx     Prostate Cancer Neg Hx     Obesity Neg Hx     Stroke Neg Hx     Substance Abuse Neg Hx     Osteoporosis Neg Hx        Social History     Tobacco Use    Smoking status: Never Smoker    Smokeless tobacco: Never Used   Substance Use Topics    Alcohol use: No      Allergies   Allergen Reactions    Ciprofloxacin Other (See Comments)     Possible intolerance --altered mental status    Flagyl [Metronidazole] Other (See Comments)     Possible intolerance--altered mental status    Pcn [Penicillins]      hives       Health Maintenance   Topic Date Due    DTaP/Tdap/Td vaccine (1 - Tdap) 06/11/1954    Shingles Vaccine (1 of 2) 06/11/1985    DEXA (modify frequency per FRAX score)  06/11/1990    Pneumococcal 65+ years Vaccine (2 of 2 - PPSV23) 03/11/2019    TSH testing  03/23/2019    Annual Wellness Visit (AWV)  06/23/2019    Flu vaccine (1) 09/01/2020    Hepatitis A vaccine  Aged Out    Hib vaccine  Aged Out    Meningococcal (ACWY) vaccine  Aged Out       Subjective:      Review of Systems   Unable to perform ROS: Dementia (Limited)   Constitutional: Negative for appetite change. HENT: Negative for congestion, ear pain, rhinorrhea and sore throat. Eyes: Negative for pain and discharge. Respiratory: Negative for cough and shortness of breath. Cardiovascular: Negative for chest pain and leg swelling.    Gastrointestinal: Negative for abdominal Alzheimer's disease with behavioral disturbance (HCC)   Continue Seroquel 100 mg qhs - may need to increase if agitation continues  Ativan 0.5 mg prn  Redirect as able  Safety and fall precautions  Continue Lamicatal - no hx of Bipolar, was on at home   2. Type 2 diabetes mellitus with diabetic nephropathy, without long-term current use of insulin (HCC)   Controlled  Continue metformin  Monitor fasting blood sugars daily - she only monitored one day per week at home - if controlled will change to twice weekly   3. Atypical atrial flutter (HCC)   Heart rate regular at present   monitor   4. Diverticulitis   No s/s at present - monitor   5. Frequent falls   Safety precautions    6. Anxiety   Ativan prn  Continue Effexor   7. Depression, unspecified depression type   Continue Effexor   8. Hypothyroidism, unspecified type   Continue Levothyroxine   Follow labs   9. Subdural hematoma (HCC) -  Hx of         Patient seen and examined. History partially obtained by chart review and nursing notes. I have reviewed patient's past medical, surgical, social, and family history and have made updates where appropriate.   See facility EMR for updated medication list.       Electronically signed by TYLER Monahan CNP on 8/10/2020 at 10:36 AM

## 2020-08-13 ENCOUNTER — OUTSIDE SERVICES (OUTPATIENT)
Dept: FAMILY MEDICINE CLINIC | Age: 85
End: 2020-08-13
Payer: MEDICARE

## 2020-08-13 PROCEDURE — 99305 1ST NF CARE MODERATE MDM 35: CPT | Performed by: FAMILY MEDICINE

## 2020-08-13 ASSESSMENT — ENCOUNTER SYMPTOMS
EYE DISCHARGE: 0
ABDOMINAL PAIN: 0
RHINORRHEA: 0
COUGH: 0
COLOR CHANGE: 0
NAUSEA: 0
EYE PAIN: 0
VOMITING: 0
SHORTNESS OF BREATH: 0
SORE THROAT: 0

## 2020-08-13 NOTE — PROGRESS NOTES
Andreina Costello is a 80 y.o. female who presents today for her medical conditions/complaints as noted below. Chief Complaint   Patient presents with    Dementia     new patient           HPI:     Balbina aMrtini is seen today for an admission to the facility. She was admitted on 8/8/2020. She has a PMX of dementia, frequent falls, DM II, depression, anxiety, HLD, hypothyroid, among others. She was previously at [facility] but reportedly due to a positive case there, they took her out of the facility and had her living with daughter. She was living with her daughter and fell hitting her head on a table, and her daughter found her. She was taken to the ER at [facility] for eval and was an emergency admission here due to increasing confusion and falls at home. She has had confusion off and on for over 2 years. She was admitted to [facility] 2 years ago for similar episode and admitted to SNF at that time. She improved, went to AL at [facility]. Only complaint is a \"sore bottom\" and nursing deny known sore but will check again to make sure. Denies shortness of breath, or cough. She is oriented to self only. She is tearful and anxious. Per staff, she is very anxious at times and walks in the [place] by herself despite being advised not to do so due to safety/fall risk. She remains on isolation due to new admission. Follows with Kirby Donis CNP, with [address] and had video visit yesterday and she increased lamictal from 50 to 100mg daily. DMII- having bid sugars checked now. sugars generally running 90-130s. Lowest 95 and highest 139.       Past Medical History:   Diagnosis Date    Depression     Diabetes mellitus ([address]     Diverticulitis     Hx of blood clots     Hyperlipidemia     Macular degeneration     Thyroid disease       Past Surgical History:   Procedure Laterality Date    COLONOSCOPY      ENDOSCOPY, COLON, DIAGNOSTIC      EYE SURGERY      cataracts    HYSTERECTOMY      JOINT REPLACEMENT      left hip/rightknee    VASCULAR SURGERY      vein stripping 1972       Family History   Problem Relation Age of Onset    Asthma Mother         hayfever    Breast Cancer Mother     Cancer Mother         stomach    Hearing Loss Father     Vision Loss Father     Cancer Sister         ovarian or uterine and lung    Birth Defects Brother     Early Death Brother     Vision Loss Brother     Diabetes Other     [place] Cora / Stillbirths Daughter     Alcohol Abuse Neg Hx     Allergy (Severe) Neg Hx     Anemia Neg Hx     Arthritis Neg Hx     Arrhythmia Neg Hx     Atrial Fibrillation Neg Hx     Coronary Art Dis Neg Hx     Colon Cancer Neg Hx     Depression Neg Hx     Heart Attack Neg Hx     Heart Disease Neg Hx     High Blood Pressure Neg Hx     High Cholesterol Neg Hx     Kidney Disease Neg Hx     Learning Disabilities Neg Hx     Mental Illness Neg Hx     Prostate Cancer Neg Hx     Obesity Neg Hx     Stroke Neg Hx     Substance Abuse Neg Hx     Osteoporosis Neg Hx        Social History     Tobacco Use    Smoking status: Never Smoker    Smokeless tobacco: Never Used   Substance Use Topics    Alcohol use: No      Allergies   Allergen Reactions    Ciprofloxacin Other (See Comments)     Possible intolerance --altered mental status    Flagyl [Metronidazole] Other (See Comments)     Possible intolerance--altered mental status    Pcn [Penicillins]      hives       Health Maintenance   Topic Date Due    DTaP/Tdap/Td vaccine (1 - Tdap) 06/11/1954    Shingles Vaccine (1 of 2) 06/11/1985    DEXA (modify frequency per FRAX score)  06/11/1990    Pneumococcal 65+ years Vaccine (2 of 2 - PPSV23) 03/11/2019    TSH testing  03/23/2019    Annual Wellness Visit (AWV)  06/23/2019    Flu vaccine (1) 09/01/2020    Hepatitis A vaccine  Aged Out    Hib vaccine  Aged Out    Meningococcal (ACWY) vaccine  Aged Out       Subjective:      Review of Systems   Unable to perform ROS: Dementia (Limited)   Constitutional: Negative for appetite change. HENT: Negative for congestion, ear pain, rhinorrhea and sore throat. Eyes: Negative for pain and discharge. Respiratory: Negative for cough and shortness of breath. Cardiovascular: Negative for chest pain and leg swelling. Gastrointestinal: Negative for abdominal pain, nausea and vomiting. Genitourinary: Negative for difficulty urinating. Musculoskeletal: Negative for arthralgias and gait problem. Skin: Negative for color change. Neurological: Negative for dizziness and headaches. Psychiatric/Behavioral: Positive for confusion. Objective:   Temperature: 97.7 °F 08/13/2020 07:36 AM Pulse: 103 per minute 08/13/2020 07:36 AM Respirations: 18 per minute 08/12/2020 12:00 AM Blood Pressure: 173 / 69 mmHg 08/13/2020 07:36 AM O2 Saturation: 94 % 08/13/2020 07:36 AM Blood Sugar: 95 mg/dL 08/13/2020 04:03 AM Weight: 143.4 lbs / Routine / Admission BMI: 28.0 08/09/2020 10:00 AM Height: 5ft 0.0in 08/09/2020 10:00       Physical Exam  Vitals signs and nursing note reviewed. Constitutional:       General: She is not in acute distress. Appearance: She is normal weight. She is not diaphoretic. HENT:      Head: Normocephalic. Right Ear: External ear normal.      Left Ear: External ear normal.      Nose: Nose normal. No congestion or rhinorrhea. Mouth/Throat:      Pharynx: No oropharyngeal exudate. Eyes:      General: No scleral icterus. Right eye: No discharge. Left eye: No discharge. Extraocular Movements: Extraocular movements intact. Conjunctiva/sclera: Conjunctivae normal.   Neck:      Musculoskeletal: Normal range of motion and neck supple. No neck rigidity or muscular tenderness. Cardiovascular:      Rate and Rhythm: Normal rate and regular rhythm. Pulses: Normal pulses. Heart sounds: Normal heart sounds. No murmur.    Pulmonary:      Effort: Pulmonary effort is normal. No respiratory distress. Breath sounds: Normal breath sounds. No wheezing, rhonchi or rales. Abdominal:      General: Bowel sounds are normal. There is no distension. Palpations: Abdomen is soft. Tenderness: There is no abdominal tenderness. Musculoskeletal: Normal range of motion. General: No swelling. Right lower leg: No edema. Left lower leg: No edema. Skin:     General: Skin is warm and dry. Coloration: Skin is not jaundiced or pale. Neurological:      Mental Status: She is alert. She is disoriented. Assessment/Plan           1. Late onset Alzheimer's disease with behavioral disturbance (HCC)   Continue Seroquel 100 mg qhs - may need to increase if agitation continues  Ativan 0.5 mg prn  Redirect as able  Safety and fall precautions  Continue Lamicatal per psych NP and increase to 100mg tomorrow  Is also on Benztropine--- for parkinsonism/EPS? Will get note from psych NP visit yester   2. Type 2 diabetes mellitus with diabetic nephropathy, without long-term current use of insulin (HCC)   Controlled  Continue metformin  Decrease accuchecks to twice per week. Pending continued readings, may d/c them completely in future   3. Atypical atrial flutter (HCC)   Heart rate regular at present   monitor   4. Diverticulitis   No s/s at present - monitor   5. Frequent falls   Safety precautions,. PT   6. Anxiety   Ativan prn  Continue Effexor and Lamictal and following with psych NP   7. Depression, unspecified depression type   Continue Effexor   8. Hypothyroidism, unspecified type   Continue Levothyroxine   Follow labs   9. Subdural hematoma (HCC) -  Hx of. No current symptoms     continue f/u with psych NP, Abhinav Woodward    Check cbc, cmp, TSH, b12 and a1c next week    Last bp elevated but rest OK. Monitor. Currently is still Full Code      Patient seen and examined. History partially obtained by chart review and nursing notes.  I have reviewed patient's past medical, surgical, social, and family history and have made updates where appropriate.   See facility EMR for updated medication list.       Electronically signed by Lorne Cleaning MD on 8/13/2020 at 10:10 AM

## 2020-09-03 RX ORDER — LORAZEPAM 0.5 MG/1
TABLET ORAL
Qty: 30 TABLET | Refills: 0 | Status: SHIPPED | OUTPATIENT
Start: 2020-09-03 | End: 2020-09-30 | Stop reason: SDUPTHER

## 2020-09-21 ENCOUNTER — OUTSIDE SERVICES (OUTPATIENT)
Dept: FAMILY MEDICINE CLINIC | Age: 85
End: 2020-09-21
Payer: MEDICARE

## 2020-09-21 VITALS
HEART RATE: 84 BPM | BODY MASS INDEX: 26.41 KG/M2 | SYSTOLIC BLOOD PRESSURE: 124 MMHG | DIASTOLIC BLOOD PRESSURE: 74 MMHG | OXYGEN SATURATION: 94 % | TEMPERATURE: 98.1 F | RESPIRATION RATE: 18 BRPM | WEIGHT: 139.8 LBS

## 2020-09-21 PROBLEM — F32.0 MILD SINGLE CURRENT EPISODE OF MAJOR DEPRESSIVE DISORDER (HCC): Status: ACTIVE | Noted: 2020-09-21

## 2020-09-21 PROCEDURE — 99309 SBSQ NF CARE MODERATE MDM 30: CPT | Performed by: NURSE PRACTITIONER

## 2020-09-21 PROCEDURE — 99490 CHRNC CARE MGMT STAFF 1ST 20: CPT | Performed by: NURSE PRACTITIONER

## 2020-09-21 ASSESSMENT — ENCOUNTER SYMPTOMS
SHORTNESS OF BREATH: 0
COUGH: 0
VOMITING: 0
NAUSEA: 0
SORE THROAT: 0
RHINORRHEA: 0
ABDOMINAL PAIN: 0

## 2020-09-21 NOTE — PROGRESS NOTES
Jaskaran Shannon is a 80 y.o. female who presents today for her medical conditions/complaints as noted below. Chief Complaint   Patient presents with    1 Month Follow-Up           HPI:     Denise Bruno is seen today for her monthly chronic illness f/u. She has a PMX of dementia, frequent falls, DM II, depression, anxiety, HLD, hypothyroid, among others. She continues with confusion and anxiety. She has been followed by Evelyn Fried CNP for her Bipolar, depression, and anxiety. I spoke with Pari Smith today and it was mutually decided that Pari Smith CNP would follow for all mental health issues and will prescribe for those. Dr. Patricia Donaldson and [facility] informed. Denise Bruno is seen while sitting in her chair, she is crying and states that the staff will not let her out of her room. She has had 2 falls in the last week. She has more restless at night and not sleeping. Pari Smith CNP informed and will make medication changes. She denies cough, shortness of breath or pain. No distress noted. She is oriented to person only.       Past Medical History:   Diagnosis Date    Depression     Diabetes mellitus [address]     Diverticulitis     Hx of blood clots     Hyperlipidemia     Macular degeneration     Thyroid disease       Past Surgical History:   Procedure Laterality Date    COLONOSCOPY      ENDOSCOPY, COLON, DIAGNOSTIC      EYE SURGERY      cataracts    HYSTERECTOMY      JOINT REPLACEMENT      left hip/rightknee    VASCULAR SURGERY      vein stripping 1972       Family History   Problem Relation Age of Onset    Asthma Mother         hayfever    Breast Cancer Mother     Cancer Mother         stomach    Hearing Loss Father     Vision Loss Father     Cancer Sister         ovarian or uterine and lung    Birth Defects Brother     Early Death Brother    Sandra Awan Vision Loss Brother     Diabetes Other    [de-identified] / Stillbirths Daughter     Alcohol Abuse Neg Hx     Allergy (Severe) Neg Hx     Anemia Neg Hx     Arthritis Neg Hx Appearance: She is normal weight. She is not diaphoretic. HENT:      Head: Normocephalic and atraumatic. Right Ear: External ear normal.      Left Ear: External ear normal.      Nose: Nose normal. No congestion or rhinorrhea. Mouth/Throat:      Pharynx: No oropharyngeal exudate. Eyes:      General: No scleral icterus. Right eye: No discharge. Left eye: No discharge. Conjunctiva/sclera: Conjunctivae normal.   Neck:      Musculoskeletal: Normal range of motion and neck supple. No neck rigidity or muscular tenderness. Cardiovascular:      Rate and Rhythm: Normal rate and regular rhythm. Pulses: Normal pulses. Heart sounds: Normal heart sounds. No murmur. Pulmonary:      Effort: Pulmonary effort is normal. No respiratory distress. Breath sounds: Normal breath sounds. No wheezing, rhonchi or rales. Abdominal:      General: Bowel sounds are normal. There is no distension. Palpations: Abdomen is soft. Tenderness: There is no abdominal tenderness. Musculoskeletal: Normal range of motion. General: No swelling or tenderness. Right lower leg: No edema. Left lower leg: No edema. Skin:     General: Skin is warm and dry. Coloration: Skin is not jaundiced or pale. Neurological:      Mental Status: She is alert. Mental status is at baseline. She is disoriented. /74   Pulse 84   Temp 98.1 °F (36.7 °C)   Resp 18   Wt 139 lb 12.8 oz (63.4 kg)   SpO2 94%   BMI 26.41 kg/m²     Assessment/Plan      1. Late onset [facility]'s disease with [facility] disturbance ([facility]   Continue meds as ordered per Berry Laws CNP for psych  All medications for mental health to be ordered per her   2. Type 2 diabetes mellitus with diabetic nephropathy, without long-term current use of insulin (HCC)   Controlled  Continue metformin  Monitor fasting blood sugars twice weekly   3.  Atypical atrial flutter (HCC)   Heart rate regular at present monitor   4. Diverticulitis   No s/s at present - monitor   5. Frequent falls   Safety precautions   Medication adjustment per Leanna Wilburn CNP   6. Anxiety   Ativan prn  Continue Effexor   7. Depression, unspecified depression type   Continue Effexor   8. Hypothyroidism, unspecified type   Continue Levothyroxine   Follow labs   9. Subdural hematoma (HCC) -  Hx of       Patient seen and examined. History partially obtained by chart review and nursing notes. I have reviewed patient's past medical, surgical, social, and family history and have made updates where appropriate.   See facility EMR for updated medication list.       Electronically signed by TYLER Schroeder CNP on 9/21/2020 at 7:13 PM

## 2020-09-30 RX ORDER — LORAZEPAM 0.5 MG/1
TABLET ORAL
Qty: 30 TABLET | Refills: 0 | Status: SHIPPED | OUTPATIENT
Start: 2020-09-30 | End: 2020-10-28

## 2020-10-08 ENCOUNTER — OUTSIDE SERVICES (OUTPATIENT)
Dept: FAMILY MEDICINE CLINIC | Age: 85
End: 2020-10-08
Payer: MEDICARE

## 2020-10-08 PROBLEM — G47.9 TROUBLE IN SLEEPING: Status: ACTIVE | Noted: 2020-10-08

## 2020-10-08 PROCEDURE — 99309 SBSQ NF CARE MODERATE MDM 30: CPT | Performed by: FAMILY MEDICINE

## 2020-10-08 ASSESSMENT — ENCOUNTER SYMPTOMS
COUGH: 0
SHORTNESS OF BREATH: 0
RHINORRHEA: 0
NAUSEA: 0
ABDOMINAL PAIN: 0
SORE THROAT: 0
VOMITING: 0

## 2020-10-08 NOTE — PROGRESS NOTES
(Severe) Neg Hx     Anemia Neg Hx     Arthritis Neg Hx     Arrhythmia Neg Hx     Atrial Fibrillation Neg Hx     Coronary Art Dis Neg Hx     Colon Cancer Neg Hx     Depression Neg Hx     Heart Attack Neg Hx     Heart Disease Neg Hx     High Blood Pressure Neg Hx     High Cholesterol Neg Hx     Kidney Disease Neg Hx     Learning Disabilities Neg Hx     Mental Illness Neg Hx     Prostate Cancer Neg Hx     Obesity Neg Hx     Stroke Neg Hx     Substance Abuse Neg Hx     Osteoporosis Neg Hx        Social History     Tobacco Use    Smoking status: Never Smoker    Smokeless tobacco: Never Used   Substance Use Topics    Alcohol use: No      Allergies   Allergen Reactions    Ciprofloxacin Other (See Comments)     Possible intolerance --altered mental status    Flagyl [Metronidazole] Other (See Comments)     Possible intolerance--altered mental status    Neomycin     Pcn [Penicillins]      hives    Propoxyphene        Health Maintenance   Topic Date Due    DTaP/Tdap/Td vaccine (1 - Tdap) 06/11/1954    Shingles Vaccine (1 of 2) 06/11/1985    DEXA (modify frequency per FRAX score)  06/11/1990    Pneumococcal 65+ years Vaccine (2 of 2 - PPSV23) 03/11/2019    TSH testing  03/23/2019    Annual Wellness Visit (AWV)  06/23/2019    Flu vaccine (1) 09/01/2020    Hepatitis A vaccine  Aged Out    Hib vaccine  Aged Out    Meningococcal (ACWY) vaccine  Aged Out       Subjective:      Review of Systems   Unable to perform ROS: Dementia (Limited)   Constitutional: Negative for appetite change. HENT: Negative for congestion, ear pain, rhinorrhea and sore throat. Respiratory: Negative for cough and shortness of breath. Cardiovascular: Negative for chest pain and leg swelling. Gastrointestinal: Negative for abdominal pain, nausea and vomiting. Genitourinary: Negative for difficulty urinating. Neurological: Positive for tremors. Negative for dizziness and headaches.    Psychiatric/Behavioral: Positive for confusion and sleep disturbance. Objective:     Physical Exam  Vitals signs and nursing note reviewed. Constitutional:       General: She is not in acute distress. Appearance: She is normal weight. She is not diaphoretic. Comments: In wheelchair, eating currently   HENT:      Head: Normocephalic and atraumatic. Right Ear: External ear normal.      Left Ear: External ear normal.      Nose: Nose normal. No congestion or rhinorrhea. Mouth/Throat:      Pharynx: No oropharyngeal exudate. Eyes:      General: No scleral icterus. Right eye: No discharge. Left eye: No discharge. Conjunctiva/sclera: Conjunctivae normal.   Neck:      Musculoskeletal: Normal range of motion and neck supple. No neck rigidity or muscular tenderness. Cardiovascular:      Rate and Rhythm: Normal rate and regular rhythm. Pulses: Normal pulses. Heart sounds: Normal heart sounds. No murmur. Pulmonary:      Effort: Pulmonary effort is normal. No respiratory distress. Breath sounds: Normal breath sounds. No wheezing, rhonchi or rales. Abdominal:      General: Bowel sounds are normal. There is no distension. Palpations: Abdomen is soft. Tenderness: There is no abdominal tenderness. Musculoskeletal: Normal range of motion. General: No swelling or tenderness. Right lower leg: No edema. Left lower leg: No edema. Skin:     General: Skin is warm and dry. Coloration: Skin is not jaundiced or pale. Neurological:      Mental Status: She is alert. Mental status is at baseline. She is disoriented. Motor: Tremor present. Comments: Tremor noted with eating. Able to feed self   Psychiatric:         Cognition and Memory: Cognition is impaired. Memory is impaired. She exhibits impaired recent memory.      Temperature: 98 °F 10/08/2020 07:17 AM Pulse: 76 per minute 10/08/2020 07:17 AM Respirations: 16 per minute 10/08/2020 07:17 AM Blood Pressure: 123 / 84 mmHg 10/08/2020 07:17 AM O2 Saturation: 96 % 10/08/2020 07:16 AM Blood Sugar: 95 mg/dL 10/08/2020 04:58 AM Weight: 143.4 lbs / Routine BMI: 28.0 10/01/2020 09:19 AM Height: 5ft 0.0in 08/09/2020 10:00       Assessment/Plan      1. Late onset Alzheimer's disease with behavioral disturbance ([facility]   Continue meds as ordered per Ashley Pelaez CNP for psych   2. Type 2 diabetes mellitus with diabetic nephropathy, without long-term current use of insulin (HCC)   Controlled  Continue metformin  Monitor fasting blood sugars twice weekly   3. Atypical atrial flutter (HCC)   Heart rate regular at present   monitor   4. Diverticulitis   No s/s at present - monitor   5. Frequent falls   Safety precautions   Medication adjustment per Ashley Pelaez CNP   6. Anxiety   Ativan prn  Continue Effexor   7. Depression, unspecified depression type   Continue Effexor   8. Hypothyroidism, unspecified type   Continue Levothyroxine   Follow labs   9. Subdural hematoma (HCC) -  Hx of       D/c Ativan 1mg prn dose. Continue ativan 0.5mg prn dose for now -- hopefully will be able to continue to decrease need with other med adjustments. I called and left message with Ashley Pelaez CNP's office about possibly increasing seroquel to 75mg at night to see if this may help decrease restlessness, ana at night, and improve sleep. ? Possibly change effexor to Zoloft to see if this may help with restlessness/sleep moreso as well? Left message regarding this option with psych. She has appt there next week. Check urine dip, and labs to r/o other causes to restlessness. Nuplazid as option for pt? .  Patient seen and examined. Histoy partially obtained by chart review and nursing notes. I have reviewed patient's past medical, surgical, social, and family history and have made updates where appropriate.   See facility EMR for updated medication list.       Electronically signed by Yessica Fuchs MD on 10/8/2020 at 1:03 PM

## 2020-11-05 ENCOUNTER — OUTSIDE SERVICES (OUTPATIENT)
Dept: FAMILY MEDICINE CLINIC | Age: 85
End: 2020-11-05
Payer: MEDICARE

## 2020-11-05 VITALS
OXYGEN SATURATION: 96 % | HEART RATE: 75 BPM | TEMPERATURE: 97.6 F | BODY MASS INDEX: 26.49 KG/M2 | DIASTOLIC BLOOD PRESSURE: 74 MMHG | WEIGHT: 140.2 LBS | SYSTOLIC BLOOD PRESSURE: 116 MMHG | RESPIRATION RATE: 18 BRPM

## 2020-11-05 PROCEDURE — 99309 SBSQ NF CARE MODERATE MDM 30: CPT | Performed by: NURSE PRACTITIONER

## 2020-11-05 PROCEDURE — 99490 CHRNC CARE MGMT STAFF 1ST 20: CPT | Performed by: NURSE PRACTITIONER

## 2020-11-05 ASSESSMENT — ENCOUNTER SYMPTOMS
SORE THROAT: 0
ABDOMINAL PAIN: 0
COUGH: 0
SHORTNESS OF BREATH: 0
DIARRHEA: 0
RHINORRHEA: 0

## 2020-11-05 NOTE — PROGRESS NOTES
Oliver Valenzuela is a 80 y.o. female that presents for 1 Month Follow-Up      This visit was performed via synchronous telecommunication system. Patient is located in the SNF  I am located in my office    HPI:  Nawaf Ko is seen today for her monthly chronic illness f/u. She has a PMX of dementia, frequent falls, DM II, depression, anxiety, HLD, hypothyroid, among others. She has been followed by Jesenia Wiley CNP for her Bipolar, depression, and anxiety. Derek Nance would prefer that sh be the only one to prescribe for these issues. She continues to have confusion and anxiety. She has had multiple falls out of bed, but Nawaf Ko states that she rolls out of bed. No injuries have been noted. She is alert, quiet, follows commands, but oriented to person only. She denies pain, cough, or shortness of breath. No distress noted. Nursing denies concerns. I have reviewed the patient's past medical history, past surgical history, allergies,medications, social and family history and I have made updates where appropriate.     Past Medical History:   Diagnosis Date    Depression     Diabetes mellitus ([address]     Diverticulitis     Hx of blood clots     Hyperlipidemia     Macular degeneration     Subarachnoid hemorrhage following injury, concussion ([facility] 12/25/2012    Thyroid disease        Past Surgical History:   Procedure Laterality Date    COLONOSCOPY      ENDOSCOPY, COLON, DIAGNOSTIC      EYE SURGERY      cataracts    HYSTERECTOMY      JOINT REPLACEMENT      left hip/rightknee    VASCULAR SURGERY      vein stripping 1972       Social History     Tobacco Use    Smoking status: Never Smoker    Smokeless tobacco: Never Used   Substance Use Topics    Alcohol use: No    Drug use: No       Family History   Problem Relation Age of Onset    Asthma Mother         hayfever    Breast Cancer Mother     Cancer Mother         stomach    Hearing Loss Father     Vision Loss Father     Cancer Sister         ovarian or uterine Coronavirus Preparedness and Response Supplemental Appropriations Act, this Virtual  Visit was conducted, with patient's consent, to reduce the patient's risk of exposure to COVID-19 and provide continuity of care for an established patient. Services were provided through a video synchronous discussion virtually to substitute for in-person SNF visit.     Electronically signed by TYLER Bass CNP on 11/5/2020 at 4:07 PM

## 2020-12-17 ENCOUNTER — OUTSIDE SERVICES (OUTPATIENT)
Dept: FAMILY MEDICINE CLINIC | Age: 85
End: 2020-12-17
Payer: MEDICARE

## 2020-12-17 VITALS
BODY MASS INDEX: 27.17 KG/M2 | SYSTOLIC BLOOD PRESSURE: 115 MMHG | WEIGHT: 143.8 LBS | HEART RATE: 63 BPM | OXYGEN SATURATION: 95 % | DIASTOLIC BLOOD PRESSURE: 73 MMHG | TEMPERATURE: 97.5 F | RESPIRATION RATE: 18 BRPM

## 2020-12-17 PROCEDURE — 99309 SBSQ NF CARE MODERATE MDM 30: CPT | Performed by: NURSE PRACTITIONER

## 2020-12-17 PROCEDURE — 99490 CHRNC CARE MGMT STAFF 1ST 20: CPT | Performed by: NURSE PRACTITIONER

## 2020-12-17 ASSESSMENT — ENCOUNTER SYMPTOMS
SHORTNESS OF BREATH: 0
SORE THROAT: 0
ABDOMINAL PAIN: 0
COUGH: 0

## 2020-12-17 NOTE — PROGRESS NOTES
Oliver Valenzuela is a 80 y.o. female who presents today for her medical conditions/complaints as noted below. Chief Complaint   Patient presents with    1 Month Follow-Up           HPI:     Nawaf Ko is seen today for her monthly chronic illness f/u. She has a PMX of dementia, frequent falls, DM II, depression, anxiety, HLD, hypothyroid, among others. She is seen while sitting in her recliner in her room and she is trying to take the phone apart and states that it is not working, but she is looking at the back and does not know what it is. I turned it over and she stated that she could call her daughter. She was able to tell me her name. She is oriented to person only. She had a fall earlier this week and did hit her head, bump noted, healing. She follows with Jesenia Wiley CNP for her mental health and Derek Nance will do all orders for this. No distress noted. No cough or shortness of breath noted. She denies pain. Nursing denies concerns.       Past Medical History:   Diagnosis Date    Depression     Diabetes mellitus ([address]     Diverticulitis     Hx of blood clots     Hyperlipidemia     Macular degeneration     Subarachnoid hemorrhage following injury, concussion ([facility] [address] 12/25/2012    Thyroid disease       Past Surgical History:   Procedure Laterality Date    COLONOSCOPY      ENDOSCOPY, COLON, DIAGNOSTIC      EYE SURGERY      cataracts    HYSTERECTOMY      JOINT REPLACEMENT      left hip/rightknee    VASCULAR SURGERY      vein stripping 1972       Family History   Problem Relation Age of Onset    Asthma Mother         hayfever    Breast Cancer Mother     Cancer Mother         stomach    Hearing Loss Father     Vision Loss Father     Cancer Sister         ovarian or uterine and lung    Birth Defects Brother     Early Death Brother     Vision Loss Brother     Diabetes Other    [de-identified] / Stillbirths Daughter     Alcohol Abuse Neg Hx     Allergy (Severe) Neg Hx     Anemia Neg Hx  Arthritis Neg Hx     Arrhythmia Neg Hx     Atrial Fibrillation Neg Hx     Coronary Art Dis Neg Hx     Colon Cancer Neg Hx     Depression Neg Hx     Heart Attack Neg Hx     Heart Disease Neg Hx     High Blood Pressure Neg Hx     High Cholesterol Neg Hx     Kidney Disease Neg Hx     Learning Disabilities Neg Hx     Mental Illness Neg Hx     Prostate Cancer Neg Hx     Obesity Neg Hx     Stroke Neg Hx     Substance Abuse Neg Hx     Osteoporosis Neg Hx        Social History     Tobacco Use    Smoking status: Never Smoker    Smokeless tobacco: Never Used   Substance Use Topics    Alcohol use: No      Allergies   Allergen Reactions    Ciprofloxacin Other (See Comments)     Possible intolerance --altered mental status    Flagyl [Metronidazole] Other (See Comments)     Possible intolerance--altered mental status    Neomycin     Pcn [Penicillins]      hives    Propoxyphene        Health Maintenance   Topic Date Due    DTaP/Tdap/Td vaccine (1 - Tdap) 06/11/1954    Shingles Vaccine (1 of 2) 06/11/1985    DEXA (modify frequency per FRAX score)  06/11/1990    Pneumococcal 65+ years Vaccine (2 of 2 - PPSV23) 03/11/2019    TSH testing  03/23/2019    Annual Wellness Visit (AWV)  06/23/2019    Flu vaccine (1) 09/01/2020    Hepatitis A vaccine  Aged Out    Hib vaccine  Aged Out    Meningococcal (ACWY) vaccine  Aged Out       Subjective:      Review of Systems   Unable to perform ROS: Dementia (Limited)   HENT: Negative for sore throat. Respiratory: Negative for cough and shortness of breath. Cardiovascular: Negative for chest pain and leg swelling. Gastrointestinal: Negative for abdominal pain. Genitourinary: Negative for difficulty urinating. Neurological: Negative for headaches. Objective:     Physical Exam  Vitals signs and nursing note reviewed. Constitutional:       General: She is not in acute distress. Appearance: Normal appearance. She is normal weight. She is not diaphoretic. HENT:      Head: Normocephalic and atraumatic. Right Ear: External ear normal.      Left Ear: External ear normal.      Nose: Nose normal. No congestion or rhinorrhea. Mouth/Throat:      Pharynx: No oropharyngeal exudate. Eyes:      General: No scleral icterus. Right eye: No discharge. Left eye: No discharge. Conjunctiva/sclera: Conjunctivae normal.   Neck:      Musculoskeletal: Normal range of motion and neck supple. No neck rigidity or muscular tenderness. Cardiovascular:      Rate and Rhythm: Normal rate and regular rhythm. Pulses: Normal pulses. Heart sounds: Normal heart sounds. No murmur. Pulmonary:      Effort: Pulmonary effort is normal. No respiratory distress. Breath sounds: Normal breath sounds. No wheezing, rhonchi or rales. Abdominal:      General: Bowel sounds are normal. There is no distension. Palpations: Abdomen is soft. Tenderness: There is no abdominal tenderness. Musculoskeletal: Normal range of motion. General: No swelling or tenderness. Right lower leg: No edema. Left lower leg: No edema. Skin:     General: Skin is warm and dry. Coloration: Skin is not jaundiced or pale. Neurological:      Mental Status: She is alert. Mental status is at baseline. She is disoriented. /73   Pulse 63   Temp 97.5 °F (36.4 °C)   Resp 18   Wt 143 lb 12.8 oz (65.2 kg)   SpO2 95%   BMI 27.17 kg/m²     Assessment/Plan      1. Late onset Alzheimer's disease with behavioral disturbance ([facility] [address]   Continue meds as ordered per Josue Sumner CNP for psych  All medications for mental health to be ordered per her  Slow decline   2. Type 2 diabetes mellitus with diabetic nephropathy, without long-term current use of insulin (HCC)   Controlled  Continue metformin  Monitor fasting blood sugars twice weekly   3.  Atypical atrial flutter ([facility] [address] Controlled rate - regular  monitor   4. Diverticulitis   No s/s at present - monitor   5. Frequent falls   Safety precautions   Medication adjustment per Jhoan Maradiaga CNP   6. Anxiety   Continue Effexor, benztropine   7. Depression, unspecified depression type   Continue Effexor   8. Hypothyroidism, unspecified type   Continue Levothyroxine   Follow labs   9. Subdural hematoma (HCC) -  Hx of       Patient seen and examined. History partially obtained by chart review and nursing notes. I have reviewed patient's past medical, surgical, social, and family history and have made updates where appropriate.   See facility EMR for updated medication list.       Electronically signed by TYLER Corral CNP on 12/17/2020 at 5:38 PM

## 2021-01-14 ENCOUNTER — OUTSIDE SERVICES (OUTPATIENT)
Dept: FAMILY MEDICINE CLINIC | Age: 86
End: 2021-01-14
Payer: MEDICARE

## 2021-01-14 DIAGNOSIS — S06.5XAA SUBDURAL HEMATOMA: ICD-10-CM

## 2021-01-14 DIAGNOSIS — E03.9 HYPOTHYROIDISM, UNSPECIFIED TYPE: ICD-10-CM

## 2021-01-14 DIAGNOSIS — F41.9 ANXIETY: ICD-10-CM

## 2021-01-14 DIAGNOSIS — I48.4 ATYPICAL ATRIAL FLUTTER (HCC): ICD-10-CM

## 2021-01-14 DIAGNOSIS — R29.6 FREQUENT FALLS: ICD-10-CM

## 2021-01-14 DIAGNOSIS — F32.A DEPRESSION, UNSPECIFIED DEPRESSION TYPE: ICD-10-CM

## 2021-01-14 DIAGNOSIS — F03.91 DEMENTIA WITH BEHAVIORAL DISTURBANCE, UNSPECIFIED DEMENTIA TYPE: Primary | ICD-10-CM

## 2021-01-14 DIAGNOSIS — E11.21 TYPE 2 DIABETES MELLITUS WITH DIABETIC NEPHROPATHY, WITHOUT LONG-TERM CURRENT USE OF INSULIN (HCC): ICD-10-CM

## 2021-01-14 PROCEDURE — 99309 SBSQ NF CARE MODERATE MDM 30: CPT | Performed by: FAMILY MEDICINE

## 2021-01-14 ASSESSMENT — ENCOUNTER SYMPTOMS
SORE THROAT: 0
SHORTNESS OF BREATH: 0
COUGH: 0
ABDOMINAL PAIN: 0

## 2021-01-14 NOTE — PROGRESS NOTES
Anthony Juan is a 80 y.o. female who presents today for her medical conditions/complaints as noted below. Chief Complaint   Patient presents with    1 Month Follow-Up           HPI:     Shilpa Lr is seen today for her monthly chronic illness f/u. She has a PMX of dementia, frequent falls, DM II, depression, anxiety, HLD, hypothyroid, among others. She is seen while sitting in her recliner in her room. She is oriented to person only. She was tearful today and noted that she was told she had a cold x 2 days (uncertain where this history came from) and that she couldn't get the \"shot\" tomorrow and was very upset due to this. Facility is getting first COVID vaccine tomorrow. I advised that she could get it if she wanted and confirmed with facility that she is supposed to get COVID vaccine tomorrow. She said this made her day and was very happy then. She had an episode few days ago with AMS. Sent to hospital with concerns for possible stroke. Symptoms resolved on their own and evaluation was unrevealing. No further episodes since. She follows with Bethany Lockwood CNP for her mental health and Erica Joseph will do all orders for this. No distress noted. No cough or shortness of breath noted. She denies pain. Nursing denies concerns.           Past Medical History:   Diagnosis Date    Depression     Diabetes mellitus ([address]     Diverticulitis     Hx of blood clots     Hyperlipidemia     Macular degeneration     Subarachnoid hemorrhage following injury, concussion ([address] 12/25/2012    Thyroid disease       Past Surgical History:   Procedure Laterality Date    COLONOSCOPY      ENDOSCOPY, COLON, DIAGNOSTIC      EYE SURGERY      cataracts    HYSTERECTOMY      JOINT REPLACEMENT      left hip/rightknee    VASCULAR SURGERY      vein stripping 1972       Family History   Problem Relation Age of Onset    Asthma Mother         hayfever    Breast Cancer Mother     Cancer Mother         stomach Gastrointestinal: Negative for abdominal pain. Genitourinary: Negative for difficulty urinating. Neurological: Negative for headaches. Psychiatric/Behavioral: Positive for confusion and dysphoric mood (at times). Objective:     Physical Exam  Vitals signs and nursing note reviewed. Constitutional:       General: She is not in acute distress. Appearance: Normal appearance. She is normal weight. She is not diaphoretic. HENT:      Head: Normocephalic and atraumatic. Right Ear: External ear normal.      Left Ear: External ear normal.      Nose: Nose normal. No congestion or rhinorrhea. Mouth/Throat:      Pharynx: No oropharyngeal exudate. Eyes:      General: No scleral icterus. Right eye: No discharge. Left eye: No discharge. Conjunctiva/sclera: Conjunctivae normal.   Neck:      Musculoskeletal: Normal range of motion and neck supple. No neck rigidity or muscular tenderness. Cardiovascular:      Rate and Rhythm: Normal rate and regular rhythm. Pulses: Normal pulses. Heart sounds: Normal heart sounds. No murmur. Pulmonary:      Effort: Pulmonary effort is normal. No respiratory distress. Breath sounds: Normal breath sounds. No wheezing, rhonchi or rales. Abdominal:      General: Bowel sounds are normal. There is no distension. Palpations: Abdomen is soft. Tenderness: There is no abdominal tenderness. Musculoskeletal: Normal range of motion. General: No swelling or tenderness. Right lower leg: No edema. Left lower leg: No edema. Skin:     General: Skin is warm and dry. Coloration: Skin is not jaundiced or pale. Neurological:      Mental Status: She is alert. Mental status is at baseline. She is disoriented. Psychiatric:         Cognition and Memory: Cognition is impaired. Memory is impaired. She exhibits impaired recent memory and impaired remote memory. Judgment: Judgment is impulsive and inappropriate. Comments: Tearful then brightned later in conversation         Temperature: 97.7 °F 01/14/2021 06:29 AM Pulse: 78 per minute 01/08/2021 07:35 AM Respirations: 12 per minute 01/04/2021 08:07 AM Blood Pressure: 135 / 78 mmHg 01/08/2021 07:35 AM O2 Saturation: 94 % 01/14/2021 06:29 AM Blood Sugar: 86 mg/dL 01/14/2021 04:08 AM Weight: 144.6 lbs / Routine BMI: 28.24 01/01/2021 12:50 PM Height: 5ft 0.0in 08/09/2020     Assessment/Plan      1. Late onset Alzheimer's disease with behavioral disturbance ([facility] [address]   Continue meds as ordered per Ronel Dumont CNP for psych  All medications for mental health to be ordered per her  Slow decline   2. Type 2 diabetes mellitus with diabetic nephropathy, without long-term current use of insulin (HCC)   Controlled  Continue metformin  Monitor fasting blood sugars twice weekly and a1c q 3-4 mos for now   3. Atypical atrial flutter (HCC)   Controlled rate - regular  monitor   4. Diverticulitis   No s/s at present - monitor   5. Frequent falls   Safety precautions   Medication adjustment per Ronel Dumont CNP   6. Anxiety   Continue Effexor, benztropine   7. Depression, unspecified depression type   Continue Effexor   8. Hypothyroidism, unspecified type   Continue Levothyroxine   Follow labs   9. Subdural hematoma (HCC) -  Hx of   10. Recent AMS- monitor for any reoccurrence. Cont ASA. Patient seen and examined. History partially obtained by chart review and nursing notes. I have reviewed patient's past medical, surgical, social, and family history and have made updates where appropriate.   See facility EMR for updated medication list.       Electronically signed by Urszula Hamlin MD on 1/14/2021 at 1:11 PM

## 2021-02-16 ENCOUNTER — OUTSIDE SERVICES (OUTPATIENT)
Dept: FAMILY MEDICINE CLINIC | Age: 86
End: 2021-02-16
Payer: MEDICARE

## 2021-02-16 VITALS
HEART RATE: 70 BPM | RESPIRATION RATE: 18 BRPM | OXYGEN SATURATION: 97 % | BODY MASS INDEX: 27.78 KG/M2 | DIASTOLIC BLOOD PRESSURE: 67 MMHG | TEMPERATURE: 97.5 F | WEIGHT: 147 LBS | SYSTOLIC BLOOD PRESSURE: 130 MMHG

## 2021-02-16 DIAGNOSIS — F41.9 ANXIETY: ICD-10-CM

## 2021-02-16 DIAGNOSIS — F32.A DEPRESSION, UNSPECIFIED DEPRESSION TYPE: ICD-10-CM

## 2021-02-16 DIAGNOSIS — R29.6 FREQUENT FALLS: ICD-10-CM

## 2021-02-16 DIAGNOSIS — E03.9 HYPOTHYROIDISM, UNSPECIFIED TYPE: ICD-10-CM

## 2021-02-16 DIAGNOSIS — G47.9 TROUBLE IN SLEEPING: ICD-10-CM

## 2021-02-16 DIAGNOSIS — S06.5XAA SUBDURAL HEMATOMA: ICD-10-CM

## 2021-02-16 DIAGNOSIS — I48.4 ATYPICAL ATRIAL FLUTTER (HCC): ICD-10-CM

## 2021-02-16 DIAGNOSIS — F03.91 DEMENTIA WITH BEHAVIORAL DISTURBANCE, UNSPECIFIED DEMENTIA TYPE: Primary | ICD-10-CM

## 2021-02-16 DIAGNOSIS — E11.21 TYPE 2 DIABETES MELLITUS WITH DIABETIC NEPHROPATHY, WITHOUT LONG-TERM CURRENT USE OF INSULIN (HCC): ICD-10-CM

## 2021-02-16 PROBLEM — G93.40 ACUTE ENCEPHALOPATHY: Status: RESOLVED | Noted: 2018-03-23 | Resolved: 2021-02-16

## 2021-02-16 PROBLEM — E86.0 DEHYDRATION, MODERATE: Status: RESOLVED | Noted: 2018-03-10 | Resolved: 2021-02-16

## 2021-02-16 PROCEDURE — 99490 CHRNC CARE MGMT STAFF 1ST 20: CPT | Performed by: NURSE PRACTITIONER

## 2021-02-16 PROCEDURE — 99309 SBSQ NF CARE MODERATE MDM 30: CPT | Performed by: NURSE PRACTITIONER

## 2021-02-16 ASSESSMENT — ENCOUNTER SYMPTOMS
COUGH: 0
SORE THROAT: 0
SHORTNESS OF BREATH: 0
CONSTIPATION: 1

## 2021-02-16 NOTE — PROGRESS NOTES
Allergy (Severe) Neg Hx     Anemia Neg Hx     Arthritis Neg Hx     Arrhythmia Neg Hx     Atrial Fibrillation Neg Hx     Coronary Art Dis Neg Hx     Colon Cancer Neg Hx     Depression Neg Hx     Heart Attack Neg Hx     Heart Disease Neg Hx     High Blood Pressure Neg Hx     High Cholesterol Neg Hx     Kidney Disease Neg Hx     Learning Disabilities Neg Hx     Mental Illness Neg Hx     Prostate Cancer Neg Hx     Obesity Neg Hx     Stroke Neg Hx     Substance Abuse Neg Hx     Osteoporosis Neg Hx        Social History     Tobacco Use    Smoking status: Never Smoker    Smokeless tobacco: Never Used   Substance Use Topics    Alcohol use: No      Allergies   Allergen Reactions    Ciprofloxacin Other (See Comments)     Possible intolerance --altered mental status    Flagyl [Metronidazole] Other (See Comments)     Possible intolerance--altered mental status    Neomycin     Pcn [Penicillins]      hives    Propoxyphene        Health Maintenance   Topic Date Due    COVID-19 Vaccine (1 of 2) 06/11/1951    DTaP/Tdap/Td vaccine (1 - Tdap) 06/11/1954    Shingles Vaccine (1 of 2) 06/11/1985    DEXA (modify frequency per FRAX score)  06/11/1990    Pneumococcal 65+ years Vaccine (2 of 2 - PPSV23) 03/11/2019    TSH testing  03/23/2019    Annual Wellness Visit (AWV)  06/23/2019    Flu vaccine (1) 09/01/2020    Hepatitis A vaccine  Aged Out    Hib vaccine  Aged Out    Meningococcal (ACWY) vaccine  Aged Out       Subjective:      Review of Systems   Unable to perform ROS: Dementia (Limited)   HENT: Negative for sore throat. Respiratory: Negative for cough and shortness of breath. Cardiovascular: Negative for chest pain and leg swelling. Gastrointestinal: Positive for constipation. Genitourinary: Negative for difficulty urinating. Objective:     Physical Exam  Vitals signs and nursing note reviewed. Constitutional:       General: She is not in acute distress.      Appearance: Normal appearance. She is normal weight. She is not diaphoretic. HENT:      Head: Normocephalic and atraumatic. Right Ear: External ear normal.      Left Ear: External ear normal.      Nose: Nose normal. No congestion or rhinorrhea. Mouth/Throat:      Pharynx: No oropharyngeal exudate. Eyes:      General: No scleral icterus. Right eye: No discharge. Left eye: No discharge. Conjunctiva/sclera: Conjunctivae normal.   Neck:      Musculoskeletal: Normal range of motion and neck supple. No neck rigidity or muscular tenderness. Cardiovascular:      Rate and Rhythm: Normal rate and regular rhythm. Pulses: Normal pulses. Heart sounds: Normal heart sounds. No murmur. Pulmonary:      Effort: Pulmonary effort is normal. No respiratory distress. Breath sounds: Normal breath sounds. No wheezing, rhonchi or rales. Abdominal:      General: Bowel sounds are normal. There is no distension. Palpations: Abdomen is soft. Tenderness: There is no abdominal tenderness. Musculoskeletal: Normal range of motion. General: No swelling or tenderness. Right lower leg: No edema. Left lower leg: No edema. Skin:     General: Skin is warm and dry. Coloration: Skin is not jaundiced or pale. Neurological:      Mental Status: She is alert. Mental status is at baseline. She is disoriented. /67   Pulse 70   Temp 97.5 °F (36.4 °C)   Resp 18   Wt 147 lb (66.7 kg)   SpO2 97%   BMI 27.78 kg/m²     Assessment/Plan      1. Late onset Alzheimer's disease with behavioral disturbance ([facility] [address]   Continue meds as ordered per Shadia Perez CNP for psych  All medications for mental health to be ordered per her  Slow decline  Continue benztropine, Lamictal, Seroquel XR, venlafaxine   2. Type 2 diabetes mellitus with diabetic nephropathy, without long-term current use of insulin (HCC)   Controlled  Continue metformin  Monitor fasting blood sugars twice weekly   3. Atypical atrial flutter (HCC)   Controlled rate - regular  monitor   4. Diverticulitis   No s/s at present - monitor   5. Frequent falls   Safety precautions    6. Anxiety   Continue Effexor, benztropine   7. Depression, unspecified depression type   Continue Effexor   8. Hypothyroidism, unspecified type   Continue Levothyroxine   Follow labs   9. Subdural hematoma (HCC) -  Hx of   10. Constipation   Continue Miralax    Patient seen and examined. History partially obtained by chart review and nursing notes. I have reviewed patient's past medical, surgical, social, and family history and have made updates where appropriate.   See facility EMR for updated medication list.       Electronically signed by TYLER Chadwick CNP on 2/16/2021 at 4:58 PM

## 2021-03-11 ENCOUNTER — OUTSIDE SERVICES (OUTPATIENT)
Dept: FAMILY MEDICINE CLINIC | Age: 86
End: 2021-03-11
Payer: MEDICARE

## 2021-03-11 DIAGNOSIS — E78.5 HYPERLIPIDEMIA, UNSPECIFIED HYPERLIPIDEMIA TYPE: ICD-10-CM

## 2021-03-11 DIAGNOSIS — E03.9 HYPOTHYROIDISM, UNSPECIFIED TYPE: ICD-10-CM

## 2021-03-11 DIAGNOSIS — G47.9 TROUBLE IN SLEEPING: ICD-10-CM

## 2021-03-11 DIAGNOSIS — E11.21 TYPE 2 DIABETES MELLITUS WITH DIABETIC NEPHROPATHY, WITHOUT LONG-TERM CURRENT USE OF INSULIN (HCC): ICD-10-CM

## 2021-03-11 DIAGNOSIS — R29.6 RECURRENT FALLS: ICD-10-CM

## 2021-03-11 DIAGNOSIS — F03.91 DEMENTIA WITH BEHAVIORAL DISTURBANCE, UNSPECIFIED DEMENTIA TYPE: Primary | ICD-10-CM

## 2021-03-11 DIAGNOSIS — F33.9 MAJOR DEPRESSION, RECURRENT, CHRONIC (HCC): ICD-10-CM

## 2021-03-11 PROBLEM — R62.7 FTT (FAILURE TO THRIVE) IN ADULT: Status: RESOLVED | Noted: 2018-03-10 | Resolved: 2021-03-11

## 2021-03-11 PROBLEM — R41.82 ALTERED MENTAL STATUS: Status: RESOLVED | Noted: 2018-03-23 | Resolved: 2021-03-11

## 2021-03-11 PROBLEM — F32.A DEPRESSION: Status: RESOLVED | Noted: 2018-03-24 | Resolved: 2021-03-11

## 2021-03-11 PROBLEM — R10.9 ABDOMINAL PAIN: Status: RESOLVED | Noted: 2018-03-10 | Resolved: 2021-03-11

## 2021-03-11 PROBLEM — K57.92 DIVERTICULITIS: Status: RESOLVED | Noted: 2021-03-11 | Resolved: 2021-03-11

## 2021-03-11 PROBLEM — I48.3 TYPICAL ATRIAL FLUTTER (HCC): Status: ACTIVE | Noted: 2021-03-11

## 2021-03-11 PROBLEM — I48.3 TYPICAL ATRIAL FLUTTER (HCC): Status: RESOLVED | Noted: 2021-03-11 | Resolved: 2021-03-11

## 2021-03-11 PROBLEM — K57.92 DIVERTICULITIS: Status: ACTIVE | Noted: 2021-03-11

## 2021-03-11 PROCEDURE — 99310 SBSQ NF CARE HIGH MDM 45: CPT | Performed by: FAMILY MEDICINE

## 2021-03-11 ASSESSMENT — ENCOUNTER SYMPTOMS
COUGH: 0
SORE THROAT: 0
SHORTNESS OF BREATH: 0
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
## 2021-04-13 ENCOUNTER — OUTSIDE SERVICES (OUTPATIENT)
Dept: FAMILY MEDICINE CLINIC | Age: 86
End: 2021-04-13
Payer: MEDICARE

## 2021-04-13 VITALS
SYSTOLIC BLOOD PRESSURE: 114 MMHG | RESPIRATION RATE: 18 BRPM | BODY MASS INDEX: 29.14 KG/M2 | HEART RATE: 86 BPM | WEIGHT: 154.2 LBS | OXYGEN SATURATION: 93 % | TEMPERATURE: 97 F | DIASTOLIC BLOOD PRESSURE: 68 MMHG

## 2021-04-13 DIAGNOSIS — F41.9 ANXIETY: ICD-10-CM

## 2021-04-13 DIAGNOSIS — F33.9 MAJOR DEPRESSION, RECURRENT, CHRONIC (HCC): ICD-10-CM

## 2021-04-13 DIAGNOSIS — R29.6 RECURRENT FALLS: ICD-10-CM

## 2021-04-13 DIAGNOSIS — S06.5XAA SUBDURAL HEMATOMA: ICD-10-CM

## 2021-04-13 DIAGNOSIS — F03.91 DEMENTIA WITH BEHAVIORAL DISTURBANCE, UNSPECIFIED DEMENTIA TYPE: Primary | ICD-10-CM

## 2021-04-13 DIAGNOSIS — G47.9 TROUBLE IN SLEEPING: ICD-10-CM

## 2021-04-13 DIAGNOSIS — E03.9 HYPOTHYROIDISM, UNSPECIFIED TYPE: ICD-10-CM

## 2021-04-13 DIAGNOSIS — E78.5 HYPERLIPIDEMIA, UNSPECIFIED HYPERLIPIDEMIA TYPE: ICD-10-CM

## 2021-04-13 DIAGNOSIS — E11.21 TYPE 2 DIABETES MELLITUS WITH DIABETIC NEPHROPATHY, WITHOUT LONG-TERM CURRENT USE OF INSULIN (HCC): ICD-10-CM

## 2021-04-13 DIAGNOSIS — F32.A DEPRESSION, UNSPECIFIED DEPRESSION TYPE: ICD-10-CM

## 2021-04-13 PROCEDURE — 99490 CHRNC CARE MGMT STAFF 1ST 20: CPT | Performed by: NURSE PRACTITIONER

## 2021-04-13 PROCEDURE — 99309 SBSQ NF CARE MODERATE MDM 30: CPT | Performed by: NURSE PRACTITIONER

## 2021-04-13 ASSESSMENT — ENCOUNTER SYMPTOMS
COUGH: 0
SHORTNESS OF BREATH: 0

## 2021-04-13 NOTE — PROGRESS NOTES
Clementine Carrera is a 80 y.o. female who presents today for her medical conditions/complaints as noted below. Chief Complaint   Patient presents with    1 Month Follow-Up           HPI:     Heather Monique is seen today for her monthly chronic illness f/u. She has a PMX of dementia, frequent falls, DM II, depression, anxiety, HLD, hypothyroid, among others. She was seen while sitting in her chair in her room. She rarely comes out of her room. She has had 4 - 5 falls in the last month, no noted injuries. She has had more anxiety and her Effexor was increased per Psych CNP on 4/6. She has gained 11 lbs since August 2020. She is eating and sleeping well. Bowels are moving. No distress noted. She is oriented to self only.             Past Medical History:   Diagnosis Date    Atrial flutter ([address]     Depression     Diabetes mellitus ([address]     Diverticulitis     Hx of blood clots     Hyperlipidemia     Macular degeneration     Subarachnoid hemorrhage following injury, concussion ([address] 12/25/2012    Subdural hematoma ([address]     Thyroid disease       Past Surgical History:   Procedure Laterality Date    COLONOSCOPY      ENDOSCOPY, COLON, DIAGNOSTIC      EYE SURGERY      cataracts    HYSTERECTOMY      JOINT REPLACEMENT      left hip/rightknee    VASCULAR SURGERY      vein stripping 1972       Family History   Problem Relation Age of Onset    Asthma Mother         hayfever    Breast Cancer Mother     Cancer Mother         stomach    Hearing Loss Father     Vision Loss Father     Cancer Sister         ovarian or uterine and lung    Birth Defects Brother     Early Death Brother    Gabriel Arauz Vision Loss Brother     Diabetes Other    [de-identified] / Stillbirths Daughter     Alcohol Abuse Neg Hx     Allergy (Severe) Neg Hx     Anemia Neg Hx     Arthritis Neg Hx     Arrhythmia Neg Hx     Atrial Fibrillation Neg Hx     Coronary Art Dis Neg Hx     Colon Cancer Neg Hx     Depression Neg Hx     Heart Attack Neg Eyes:      General: No scleral icterus. Right eye: No discharge. Left eye: No discharge. Conjunctiva/sclera: Conjunctivae normal.   Neck:      Musculoskeletal: Normal range of motion and neck supple. No neck rigidity or muscular tenderness. Cardiovascular:      Rate and Rhythm: Normal rate and regular rhythm. Pulses: Normal pulses. Heart sounds: Normal heart sounds. No murmur. Pulmonary:      Effort: Pulmonary effort is normal. No respiratory distress. Breath sounds: Normal breath sounds. No wheezing, rhonchi or rales. Abdominal:      General: Bowel sounds are normal. There is no distension. Palpations: Abdomen is soft. Tenderness: There is no abdominal tenderness. Musculoskeletal: Normal range of motion. General: No swelling or tenderness. Right lower leg: No edema. Left lower leg: No edema. Skin:     General: Skin is warm and dry. Coloration: Skin is not jaundiced or pale. Neurological:      Mental Status: She is alert. Mental status is at baseline. She is disoriented. Psychiatric:         Mood and Affect: Mood normal.         Behavior: Behavior normal.       /68   Pulse 86   Temp 97 °F (36.1 °C)   Resp 18   Wt 154 lb 3.2 oz (69.9 kg)   SpO2 93%   BMI 29.14 kg/m²     Assessment/Plan      1. Late onset Alzheimer's disease with behavioral disturbance ([facility] [address]   Continue meds as ordered per Karissa Ramos CNP for psych  All medications for mental health to be ordered per her  Slow decline  Continue benztropine, Lamictal, Seroquel XR, venlafaxine   2. Type 2 diabetes mellitus with diabetic nephropathy, without long-term current use of insulin (HCC)   Controlled  Continue metformin  Monitor fasting blood sugars twice weekly   3. Atypical atrial flutter (HCC)   Controlled rate - regular  monitor   4. Diverticulitis   No s/s at present - monitor   5. Frequent falls   Safety and fall precautions    6.  Anxiety   Continue Effexor, benztropine per Psych CNP   7. Depression, unspecified depression type   As above   8. Hypothyroidism, unspecified type   Continue Levothyroxine   Follow labs   9. Subdural hematoma (HCC) -  Hx of     Chronic illnesses and diseases reviewed. Medications reviewed. No new orders at this time. Continue current medications. Please call if needed. Patient seen and examined. History partially obtained by chart review and nursing notes. I have reviewed patient's past medical, surgical, social, and family history and have made updates where appropriate.   See facility EMR for updated medication list.       Electronically signed by TYLER Whitten CNP on 4/13/2021 at 5:55 PM

## 2021-05-13 ENCOUNTER — OUTSIDE SERVICES (OUTPATIENT)
Dept: FAMILY MEDICINE CLINIC | Age: 86
End: 2021-05-13
Payer: MEDICARE

## 2021-05-13 DIAGNOSIS — G30.1 LATE ONSET ALZHEIMER'S DISEASE WITH BEHAVIORAL DISTURBANCE (HCC): ICD-10-CM

## 2021-05-13 DIAGNOSIS — F33.9 MAJOR DEPRESSION, RECURRENT, CHRONIC (HCC): ICD-10-CM

## 2021-05-13 DIAGNOSIS — F02.818 LATE ONSET ALZHEIMER'S DISEASE WITH BEHAVIORAL DISTURBANCE (HCC): ICD-10-CM

## 2021-05-13 DIAGNOSIS — F32.A DEPRESSION, UNSPECIFIED DEPRESSION TYPE: ICD-10-CM

## 2021-05-13 DIAGNOSIS — E03.9 HYPOTHYROIDISM, UNSPECIFIED TYPE: ICD-10-CM

## 2021-05-13 DIAGNOSIS — F03.91 DEMENTIA WITH BEHAVIORAL DISTURBANCE, UNSPECIFIED DEMENTIA TYPE: Primary | ICD-10-CM

## 2021-05-13 DIAGNOSIS — E11.21 TYPE 2 DIABETES MELLITUS WITH DIABETIC NEPHROPATHY, WITHOUT LONG-TERM CURRENT USE OF INSULIN (HCC): ICD-10-CM

## 2021-05-13 PROCEDURE — 99309 SBSQ NF CARE MODERATE MDM 30: CPT | Performed by: FAMILY MEDICINE

## 2021-05-13 ASSESSMENT — ENCOUNTER SYMPTOMS
SHORTNESS OF BREATH: 0
COUGH: 0

## 2021-05-13 NOTE — PROGRESS NOTES
Max Davis is a 80 y.o. female who presents today for her medical conditions/complaints as noted below. Chief Complaint   Patient presents with    1 Month Follow-Up           HPI:     Victoriano East is seen today for her monthly chronic illness f/u. She has a PMX of dementia, frequent falls, DM II, depression, anxiety, HLD, hypothyroid, among others. She was seen while sitting in her chair in the dining room. She has had more anxiety and her Effexor was increased per Psych CNP on 4/6 and she also added   She is eating and sleeping well. She denies any complaints or concerns today. No distress noted. Is note to have occasional trouble getting food on her fork while we talk, but is able to feed herself slowly . She is oriented to self only. Tearful and anxious a times, but ipmroved    DMII- controlled. a1c 6.2% on 5/2021.   No lows            Past Medical History:   Diagnosis Date    Atrial flutter ([address]     Depression     Diabetes mellitus ([address]     Diverticulitis     Hx of blood clots     Hyperlipidemia     Macular degeneration     Subarachnoid hemorrhage following injury, concussion ([address] 12/25/2012    Subdural hematoma ([address]     Thyroid disease       Past Surgical History:   Procedure Laterality Date    COLONOSCOPY      ENDOSCOPY, COLON, DIAGNOSTIC      EYE SURGERY      cataracts    HYSTERECTOMY      JOINT REPLACEMENT      left hip/rightknee    VASCULAR SURGERY      vein stripping 1972       Family History   Problem Relation Age of Onset    Asthma Mother         hayfever    Breast Cancer Mother     Cancer Mother         stomach    Hearing Loss Father     Vision Loss Father     Cancer Sister         ovarian or uterine and lung    Birth Defects Brother     Early Death Brother    [employer] Vision Loss Brother     Diabetes Other    [de-identified] / Stillbirths Daughter     Alcohol Abuse Neg Hx     Allergy (Severe) Neg Hx     Anemia Neg Hx     Arthritis Neg Hx     Arrhythmia Neg Hx     Atrial Fibrillation Neg Hx     Coronary Art Dis Neg Hx     Colon Cancer Neg Hx     Depression Neg Hx     Heart Attack Neg Hx     Heart Disease Neg Hx     High Blood Pressure Neg Hx     High Cholesterol Neg Hx     Kidney Disease Neg Hx     Learning Disabilities Neg Hx     Mental Illness Neg Hx     Prostate Cancer Neg Hx     Obesity Neg Hx     Stroke Neg Hx     Substance Abuse Neg Hx     Osteoporosis Neg Hx        Social History     Tobacco Use    Smoking status: Never Smoker    Smokeless tobacco: Never Used   Substance Use Topics    Alcohol use: No      Allergies   Allergen Reactions    Ciprofloxacin Other (See Comments)     Possible intolerance --altered mental status    Flagyl [Metronidazole] Other (See Comments)     Possible intolerance--altered mental status    Neomycin     Pcn [Penicillins]      hives    Propoxyphene        Health Maintenance   Topic Date Due    COVID-19 Vaccine (1) Never done    DTaP/Tdap/Td vaccine (1 - Tdap) Never done    Shingles Vaccine (1 of 2) Never done    DEXA (modify frequency per FRAX score)  Never done    Pneumococcal 65+ years Vaccine (2 of 2 - PPSV23) 03/11/2019    TSH testing  03/23/2019    Annual Wellness Visit (AWV)  Never done    Flu vaccine (Season Ended) 09/01/2021    Hepatitis A vaccine  Aged Out    Hib vaccine  Aged Out    Meningococcal (ACWY) vaccine  Aged Out       Subjective:      Review of Systems   Unable to perform ROS: Dementia (Limited)   Respiratory: Negative for cough and shortness of breath. Cardiovascular: Negative for chest pain and leg swelling. Gastrointestinal: Negative for abdominal pain, constipation and diarrhea. Genitourinary: Negative for difficulty urinating. Objective:     Physical Exam  Vitals signs and nursing note reviewed. Constitutional:       General: She is not in acute distress. Appearance: Normal appearance. She is normal weight. She is not diaphoretic.    HENT:      Head: Normocephalic and health to be ordered per her  Slow decline  Continue benztropine, Lamictal, Seroquel XR, venlafaxine   2. Type 2 diabetes mellitus with diabetic nephropathy, without long-term current use of insulin (HCC)   Controlled  A1c in May 2021 very good at 6.2%, and given age to to decrease polypharmacy, stopped metformin. Continue with no meds and monitor   3. Atypical atrial flutter (HCC)   Controlled rate - regular  monitor   4. Diverticulitis   No s/s at present - monitor   5. Frequent falls   Safety and fall precautions    6. Anxiety   Continue Effexor, benztropine per Psych CNP   7. Depression, unspecified depression type   As above   8. Hypothyroidism, unspecified type   Continue Levothyroxine   Check TSH-- due next month   9. Subdural hematoma (HCC) -  Hx of     Patient seen and examined. History partially obtained by chart review and nursing notes. I have reviewed patient's past medical, surgical, social, and family history and have made updates where appropriate.   See facility EMR for updated medication list.      F/u in 1 mos, sooner prn

## 2021-05-15 ASSESSMENT — ENCOUNTER SYMPTOMS
DIARRHEA: 0
CONSTIPATION: 0
ABDOMINAL PAIN: 0

## 2021-06-17 ENCOUNTER — OUTSIDE SERVICES (OUTPATIENT)
Dept: FAMILY MEDICINE CLINIC | Age: 86
End: 2021-06-17
Payer: MEDICARE

## 2021-06-17 VITALS
SYSTOLIC BLOOD PRESSURE: 102 MMHG | WEIGHT: 158.4 LBS | TEMPERATURE: 97.8 F | HEART RATE: 92 BPM | BODY MASS INDEX: 29.93 KG/M2 | OXYGEN SATURATION: 98 % | RESPIRATION RATE: 18 BRPM | DIASTOLIC BLOOD PRESSURE: 58 MMHG

## 2021-06-17 DIAGNOSIS — F03.91 DEMENTIA WITH BEHAVIORAL DISTURBANCE, UNSPECIFIED DEMENTIA TYPE: Primary | ICD-10-CM

## 2021-06-17 DIAGNOSIS — E11.21 TYPE 2 DIABETES MELLITUS WITH DIABETIC NEPHROPATHY, WITHOUT LONG-TERM CURRENT USE OF INSULIN (HCC): ICD-10-CM

## 2021-06-17 DIAGNOSIS — E03.9 HYPOTHYROIDISM, UNSPECIFIED TYPE: ICD-10-CM

## 2021-06-17 DIAGNOSIS — F02.818 LATE ONSET ALZHEIMER'S DISEASE WITH BEHAVIORAL DISTURBANCE (HCC): ICD-10-CM

## 2021-06-17 DIAGNOSIS — F41.9 ANXIETY: ICD-10-CM

## 2021-06-17 DIAGNOSIS — E78.5 HYPERLIPIDEMIA, UNSPECIFIED HYPERLIPIDEMIA TYPE: ICD-10-CM

## 2021-06-17 DIAGNOSIS — F33.9 MAJOR DEPRESSION, RECURRENT, CHRONIC (HCC): ICD-10-CM

## 2021-06-17 DIAGNOSIS — S06.5XAA SUBDURAL HEMATOMA: ICD-10-CM

## 2021-06-17 DIAGNOSIS — G30.1 LATE ONSET ALZHEIMER'S DISEASE WITH BEHAVIORAL DISTURBANCE (HCC): ICD-10-CM

## 2021-06-17 DIAGNOSIS — R29.6 RECURRENT FALLS: ICD-10-CM

## 2021-06-17 DIAGNOSIS — F32.A DEPRESSION, UNSPECIFIED DEPRESSION TYPE: ICD-10-CM

## 2021-06-17 PROCEDURE — 99309 SBSQ NF CARE MODERATE MDM 30: CPT | Performed by: NURSE PRACTITIONER

## 2021-06-17 PROCEDURE — 99490 CHRNC CARE MGMT STAFF 1ST 20: CPT | Performed by: NURSE PRACTITIONER

## 2021-06-17 ASSESSMENT — ENCOUNTER SYMPTOMS
SHORTNESS OF BREATH: 0
COUGH: 0

## 2021-06-17 NOTE — PROGRESS NOTES
Atrial Fibrillation Neg Hx     Coronary Art Dis Neg Hx     Colon Cancer Neg Hx     Depression Neg Hx     Heart Attack Neg Hx     Heart Disease Neg Hx     High Blood Pressure Neg Hx     High Cholesterol Neg Hx     Kidney Disease Neg Hx     Learning Disabilities Neg Hx     Mental Illness Neg Hx     Prostate Cancer Neg Hx     Obesity Neg Hx     Stroke Neg Hx     Substance Abuse Neg Hx     Osteoporosis Neg Hx        Social History     Tobacco Use    Smoking status: Never Smoker    Smokeless tobacco: Never Used   Substance Use Topics    Alcohol use: No      Allergies   Allergen Reactions    Ciprofloxacin Other (See Comments)     Possible intolerance --altered mental status    Flagyl [Metronidazole] Other (See Comments)     Possible intolerance--altered mental status    Neomycin     Pcn [Penicillins]      hives    Propoxyphene        Health Maintenance   Topic Date Due    COVID-19 Vaccine (1) Never done    DTaP/Tdap/Td vaccine (1 - Tdap) Never done    Shingles Vaccine (1 of 2) Never done    Pneumococcal 65+ years Vaccine (2 of 2 - PPSV23) 03/11/2019    TSH testing  03/23/2019    Annual Wellness Visit (AWV)  Never done    Flu vaccine (Season Ended) 09/01/2021    Hepatitis A vaccine  Aged Out    Hib vaccine  Aged Out    Meningococcal (ACWY) vaccine  Aged Out       Subjective:      Review of Systems   Unable to perform ROS: Dementia (Limited)   Respiratory: Negative for cough and shortness of breath. Cardiovascular: Negative for chest pain and leg swelling. Objective:     Physical Exam  Vitals and nursing note reviewed. Constitutional:       General: She is not in acute distress. Appearance: Normal appearance. She is normal weight. She is not diaphoretic. HENT:      Head: Normocephalic and atraumatic. Right Ear: External ear normal.      Left Ear: External ear normal.      Nose: Nose normal. No congestion or rhinorrhea.       Mouth/Throat:      Pharynx: No oropharyngeal exudate. Eyes:      General: No scleral icterus. Right eye: No discharge. Left eye: No discharge. Conjunctiva/sclera: Conjunctivae normal.   Cardiovascular:      Rate and Rhythm: Normal rate and regular rhythm. Pulses: Normal pulses. Heart sounds: Normal heart sounds. No murmur heard. Pulmonary:      Effort: Pulmonary effort is normal. No respiratory distress. Breath sounds: Normal breath sounds. No wheezing, rhonchi or rales. Abdominal:      General: Bowel sounds are normal. There is no distension. Palpations: Abdomen is soft. Tenderness: There is no abdominal tenderness. There is no guarding. Musculoskeletal:         General: No swelling or tenderness. Normal range of motion. Cervical back: Normal range of motion and neck supple. No rigidity or tenderness. No muscular tenderness. Right lower leg: No edema. Left lower leg: No edema. Skin:     General: Skin is warm and dry. Coloration: Skin is not jaundiced or pale. Neurological:      Mental Status: She is alert. Mental status is at baseline. She is disoriented. Psychiatric:         Mood and Affect: Mood normal.         Behavior: Behavior normal.       BP (!) 102/58   Pulse 92   Temp 97.8 °F (36.6 °C)   Resp 18   Wt 158 lb 6.4 oz (71.8 kg)   SpO2 98%   BMI 29.93 kg/m²     Assessment/Plan      1. Late onset Alzheimer's disease with behavioral disturbance ([facility] [address]   Continue meds as ordered per Templeton Half CNP for psych  All medications for mental health to be ordered per her  Slow decline   2. Type 2 diabetes mellitus with diabetic nephropathy, without long-term current use of insulin ([facility])   Controlled  Monitor blood sugars and labs   3. Atypical atrial flutter (HCC)   Controlled rate - regular - monitor   4. Diverticulitis   No s/s at present - monitor   5. Frequent falls   Safety and fall precautions   Last fall 6/4 - no injuries   6.  Anxiety   Continue Effexor, benztropine per

## 2021-06-23 PROBLEM — E53.8 B12 DEFICIENCY: Status: ACTIVE | Noted: 2021-06-23

## 2021-07-20 ENCOUNTER — OUTSIDE SERVICES (OUTPATIENT)
Dept: FAMILY MEDICINE CLINIC | Age: 86
End: 2021-07-20
Payer: MEDICARE

## 2021-07-20 VITALS
HEART RATE: 75 BPM | BODY MASS INDEX: 30.57 KG/M2 | SYSTOLIC BLOOD PRESSURE: 129 MMHG | OXYGEN SATURATION: 97 % | TEMPERATURE: 97.2 F | RESPIRATION RATE: 20 BRPM | WEIGHT: 161.8 LBS | DIASTOLIC BLOOD PRESSURE: 67 MMHG

## 2021-07-20 DIAGNOSIS — E11.21 TYPE 2 DIABETES MELLITUS WITH DIABETIC NEPHROPATHY, WITHOUT LONG-TERM CURRENT USE OF INSULIN (HCC): ICD-10-CM

## 2021-07-20 DIAGNOSIS — E78.5 HYPERLIPIDEMIA, UNSPECIFIED HYPERLIPIDEMIA TYPE: ICD-10-CM

## 2021-07-20 DIAGNOSIS — R29.6 RECURRENT FALLS: ICD-10-CM

## 2021-07-20 DIAGNOSIS — E03.9 HYPOTHYROIDISM, UNSPECIFIED TYPE: ICD-10-CM

## 2021-07-20 DIAGNOSIS — G47.9 TROUBLE IN SLEEPING: ICD-10-CM

## 2021-07-20 DIAGNOSIS — F33.9 MAJOR DEPRESSION, RECURRENT, CHRONIC (HCC): ICD-10-CM

## 2021-07-20 DIAGNOSIS — F03.91 DEMENTIA WITH BEHAVIORAL DISTURBANCE, UNSPECIFIED DEMENTIA TYPE: Primary | ICD-10-CM

## 2021-07-20 PROCEDURE — 99490 CHRNC CARE MGMT STAFF 1ST 20: CPT | Performed by: NURSE PRACTITIONER

## 2021-07-20 PROCEDURE — 99309 SBSQ NF CARE MODERATE MDM 30: CPT | Performed by: NURSE PRACTITIONER

## 2021-07-20 ASSESSMENT — ENCOUNTER SYMPTOMS
SHORTNESS OF BREATH: 0
COUGH: 0
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
## 2021-08-04 ENCOUNTER — OUTSIDE SERVICES (OUTPATIENT)
Dept: FAMILY MEDICINE CLINIC | Age: 86
End: 2021-08-04
Payer: MEDICARE

## 2021-08-04 DIAGNOSIS — F33.9 MAJOR DEPRESSION, RECURRENT, CHRONIC (HCC): ICD-10-CM

## 2021-08-04 DIAGNOSIS — E03.9 HYPOTHYROIDISM, UNSPECIFIED TYPE: ICD-10-CM

## 2021-08-04 DIAGNOSIS — G47.9 TROUBLE IN SLEEPING: ICD-10-CM

## 2021-08-04 DIAGNOSIS — E11.21 TYPE 2 DIABETES MELLITUS WITH DIABETIC NEPHROPATHY, WITHOUT LONG-TERM CURRENT USE OF INSULIN (HCC): Primary | ICD-10-CM

## 2021-08-04 DIAGNOSIS — R29.6 RECURRENT FALLS: ICD-10-CM

## 2021-08-04 DIAGNOSIS — E78.5 HYPERLIPIDEMIA, UNSPECIFIED HYPERLIPIDEMIA TYPE: ICD-10-CM

## 2021-08-04 DIAGNOSIS — F03.91 DEMENTIA WITH BEHAVIORAL DISTURBANCE, UNSPECIFIED DEMENTIA TYPE: ICD-10-CM

## 2021-08-04 PROCEDURE — 99309 SBSQ NF CARE MODERATE MDM 30: CPT | Performed by: FAMILY MEDICINE

## 2021-08-04 ASSESSMENT — ENCOUNTER SYMPTOMS
SHORTNESS OF BREATH: 0
COUGH: 0

## 2021-08-04 NOTE — PROGRESS NOTES
Kassandra Bailey is a 80 y.o. female who presents today for her medical conditions/complaints as noted below. Chief Complaint   Patient presents with    1 Month Follow-Up           HPI:     Jeffrey Ortega is seen today for her monthly chronic illness f/u. She has a PMX of dementia, frequent falls, DM II, depression, anxiety, HLD, hypothyroid, among others. She is seen while sitting in her chair. She is alert and oriented to self. She is in a good mood today and pleasant. I helped  candies that she dropped and she was very happy and appreciative for this. She denies pain, cough, or shortness of breath. Nursing with no new concerns.           Past Medical History:   Diagnosis Date    Atrial flutter ([address]     Depression     Diabetes mellitus ([address]     Diverticulitis     Hx of blood clots     Hyperlipidemia     Macular degeneration     Subarachnoid hemorrhage following injury, concussion ([address] 12/25/2012    Subdural hematoma ([address]     Thyroid disease       Past Surgical History:   Procedure Laterality Date    COLONOSCOPY      ENDOSCOPY, COLON, DIAGNOSTIC      EYE SURGERY      cataracts    HYSTERECTOMY      JOINT REPLACEMENT      left hip/rightknee    VASCULAR SURGERY      vein stripping 1972       Family History   Problem Relation Age of Onset    Asthma Mother         hayfever    Breast Cancer Mother     Cancer Mother         stomach    Hearing Loss Father     Vision Loss Father     Cancer Sister         ovarian or uterine and lung    Birth Defects Brother     Early Death Brother     Vision Loss Brother     Diabetes Other    [de-identified] / Stillbirths Daughter     Alcohol Abuse Neg Hx     Allergy (Severe) Neg Hx     Anemia Neg Hx     Arthritis Neg Hx     Arrhythmia Neg Hx     Atrial Fibrillation Neg Hx     Coronary Art Dis Neg Hx     Colon Cancer Neg Hx     Depression Neg Hx     Heart Attack Neg Hx     Heart Disease Neg Hx     High Blood Pressure Neg Hx     High Cholesterol Neg Hx     Kidney Disease Neg Hx     Learning Disabilities Neg Hx     Mental Illness Neg Hx     Prostate Cancer Neg Hx     Obesity Neg Hx     Stroke Neg Hx     Substance Abuse Neg Hx     Osteoporosis Neg Hx        Social History     Tobacco Use    Smoking status: Never Smoker    Smokeless tobacco: Never Used   Substance Use Topics    Alcohol use: No      Allergies   Allergen Reactions    Ciprofloxacin Other (See Comments)     Possible intolerance --altered mental status    Flagyl [Metronidazole] Other (See Comments)     Possible intolerance--altered mental status    Neomycin     Pcn [Penicillins]      hives    Propoxyphene        Health Maintenance   Topic Date Due    COVID-19 Vaccine (1) Never done    DTaP/Tdap/Td vaccine (1 - Tdap) Never done    Shingles Vaccine (1 of 2) Never done    Pneumococcal 65+ years Vaccine (2 of 2 - PPSV23) 03/11/2019    TSH testing  03/23/2019    Annual Wellness Visit (AWV)  Never done    Flu vaccine (1) 09/01/2021    Hepatitis A vaccine  Aged Out    Hib vaccine  Aged Out    Meningococcal (ACWY) vaccine  Aged Out       Subjective:      Review of Systems   Unable to perform ROS: Dementia (Limited)   Respiratory: Negative for cough and shortness of breath. Cardiovascular: Negative for chest pain and leg swelling. Objective:     Physical Exam  Vitals and nursing note reviewed. Constitutional:       General: She is not in acute distress. Appearance: Normal appearance. She is normal weight. She is not diaphoretic. HENT:      Head: Normocephalic and atraumatic. Right Ear: External ear normal.      Left Ear: External ear normal.      Nose: Nose normal. No congestion or rhinorrhea. Mouth/Throat:      Pharynx: No oropharyngeal exudate. Eyes:      General: No scleral icterus. Right eye: No discharge. Left eye: No discharge.       Conjunctiva/sclera: Conjunctivae normal.   Cardiovascular:      Rate and Rhythm: Normal rate and regular rhythm. Pulses: Normal pulses. Heart sounds: Normal heart sounds. No murmur heard. Pulmonary:      Effort: Pulmonary effort is normal. No respiratory distress. Breath sounds: Normal breath sounds. No wheezing, rhonchi or rales. Abdominal:      General: Bowel sounds are normal. There is no distension. Palpations: Abdomen is soft. Tenderness: There is no abdominal tenderness. There is no guarding. Musculoskeletal:         General: No swelling or tenderness. Normal range of motion. Cervical back: Normal range of motion and neck supple. No rigidity or tenderness. No muscular tenderness. Right lower leg: No edema. Left lower leg: No edema. Skin:     General: Skin is warm and dry. Coloration: Skin is not jaundiced or pale. Neurological:      Mental Status: She is alert. Mental status is at baseline. She is disoriented. Psychiatric:         Mood and Affect: Mood normal.         Behavior: Behavior normal.         Cognition and Memory: Cognition is impaired. Memory is impaired. She exhibits impaired recent memory and impaired remote memory. Most Recent Vitals Date/Time Taken  Temperature: 97.7 °F 08/04/2021 03:03 PM  Pulse: 72 per minute 08/04/2021 06:04 AM  Respirations: 18 per minute 08/04/2021 06:04 AM  Blood Pressure: 131 / 74 mmHg 08/04/2021 06:04 AM  O2 Saturation: 95 % 08/04/2021 03:03 PM  Blood Sugar: 114 mg/dL 08/02/2021 04:31 AM  Weight: 161.2 lbs / Routine       BMI: 31.48 08/01/2021 08:43 AM  Height: 5ft 0.0in 08/09/2020 10:00 AM    Assessment/Plan      1. Late onset Alzheimer's disease with behavioral disturbance ([facility]   Continue meds as ordered per Kenisha Galvez CNP for psych  All medications for mental health to be ordered per her  Slow decline continues     2.  Type 2 diabetes mellitus with diabetic nephropathy, without long-term current use of insulin (HCC)   Controlled - on no meds  Monitor blood sugars and labs  A1c 6.2% on 5/2021 and controlled. Check a1c at least q 6 mos. 3. Atypical atrial flutter (HCC)   Controlled rate - regular - monitor     4. Diverticulitis   No s/s at present - monitor     5. Frequent falls   Safety and fall precautions      6. Anxiety   Continue Effexor, benztropine per Psych CNP     7. Depression, unspecified depression type   As above     8. Hypothyroidism, unspecified type   Continue Levothyroxine   Follow labs     9. Subdural hematoma (HCC) -  Hx of     Patient seen and examined. History partially obtained by chart review and nursing notes. I have reviewed patient's past medical, surgical, social, and family history and have made updates where appropriate.   See facility EMR for updated medication list.

## 2021-08-23 ENCOUNTER — OUTSIDE SERVICES (OUTPATIENT)
Dept: FAMILY MEDICINE CLINIC | Age: 86
End: 2021-08-23
Payer: MEDICARE

## 2021-08-23 VITALS
SYSTOLIC BLOOD PRESSURE: 155 MMHG | BODY MASS INDEX: 30.42 KG/M2 | WEIGHT: 161 LBS | OXYGEN SATURATION: 95 % | HEART RATE: 82 BPM | TEMPERATURE: 97.3 F | DIASTOLIC BLOOD PRESSURE: 74 MMHG | RESPIRATION RATE: 18 BRPM

## 2021-08-23 DIAGNOSIS — F03.91 DEMENTIA WITH BEHAVIORAL DISTURBANCE, UNSPECIFIED DEMENTIA TYPE: ICD-10-CM

## 2021-08-23 DIAGNOSIS — R29.6 RECURRENT FALLS: ICD-10-CM

## 2021-08-23 DIAGNOSIS — R07.9 CHEST PAIN, UNSPECIFIED TYPE: Primary | ICD-10-CM

## 2021-08-23 DIAGNOSIS — F33.9 MAJOR DEPRESSION, RECURRENT, CHRONIC (HCC): ICD-10-CM

## 2021-08-23 PROCEDURE — 99309 SBSQ NF CARE MODERATE MDM 30: CPT | Performed by: NURSE PRACTITIONER

## 2021-08-23 PROCEDURE — 99380 NURSING FAC CARE SUPERVISION: CPT | Performed by: NURSE PRACTITIONER

## 2021-08-23 NOTE — PROGRESS NOTES
Monica Puga is a 80 y.o. female who presents today for her medical conditions/complaints as noted below. Chief Complaint   Patient presents with    Other     follow up chest pain           HPI:     Ananth Kumar is seen today as a follow up from a recent ER visit on 8/20 for chest pain. She was treated and released from [facility] on the same day. She is seen while sitting in her recliner in her room. She does not remember having chest pain or going to the ER. She currently denies chest pain and per noted has not had any further episodes of chest pain since 8/20. She also denies cough or shortness of breath. No distress noted on room air. She is oriented to self only. She is tearful during visit, she feels that someone is trying to kill her. Psych meds per behavioral health in [facility]. I spent >20 minutes trying to calm her down. She is to f/u with Cardiology next week. Staff aware.       Past Medical History:   Diagnosis Date    Atrial flutter ([address]     Depression     Diabetes mellitus ([address]     Diverticulitis     Hx of blood clots     Hyperlipidemia     Macular degeneration     Subarachnoid hemorrhage following injury, concussion ([address] 12/25/2012    Subdural hematoma ([address]     Thyroid disease       Past Surgical History:   Procedure Laterality Date    COLONOSCOPY      ENDOSCOPY, COLON, DIAGNOSTIC      EYE SURGERY      cataracts    HYSTERECTOMY      JOINT REPLACEMENT      left hip/rightknee    VASCULAR SURGERY      vein stripping 1972       Family History   Problem Relation Age of Onset    Asthma Mother         hayfever    Breast Cancer Mother     Cancer Mother         stomach    Hearing Loss Father     Vision Loss Father     Cancer Sister         ovarian or uterine and lung    Birth Defects Brother     Early Death Brother    [facility] Vision Loss Brother     Diabetes Other    [de-identified] / Stillbirths Daughter     Alcohol Abuse Neg Hx     Allergy (Severe) Neg Hx     Anemia Neg Hx     Arthritis Neg Hx     Arrhythmia Neg Hx     Atrial Fibrillation Neg Hx     Coronary Art Dis Neg Hx     Colon Cancer Neg Hx     Depression Neg Hx     Heart Attack Neg Hx     Heart Disease Neg Hx     High Blood Pressure Neg Hx     High Cholesterol Neg Hx     Kidney Disease Neg Hx     Learning Disabilities Neg Hx     Mental Illness Neg Hx     Prostate Cancer Neg Hx     Obesity Neg Hx     Stroke Neg Hx     Substance Abuse Neg Hx     Osteoporosis Neg Hx        Social History     Tobacco Use    Smoking status: Never Smoker    Smokeless tobacco: Never Used   Substance Use Topics    Alcohol use: No      Allergies   Allergen Reactions    Ciprofloxacin Other (See Comments)     Possible intolerance --altered mental status    Flagyl [Metronidazole] Other (See Comments)     Possible intolerance--altered mental status    Neomycin     Pcn [Penicillins]      hives    Propoxyphene        Health Maintenance   Topic Date Due    COVID-19 Vaccine (1) Never done    DTaP/Tdap/Td vaccine (1 - Tdap) Never done    Shingles Vaccine (1 of 2) Never done    Pneumococcal 65+ years Vaccine (2 of 2 - PPSV23) 03/11/2019    TSH testing  03/23/2019    Annual Wellness Visit (AWV)  Never done    Flu vaccine (1) 09/01/2021    Hepatitis A vaccine  Aged Out    Hib vaccine  Aged Out    Meningococcal (ACWY) vaccine  Aged Out       Subjective:      Review of Systems   Unable to perform ROS: Dementia       Objective:     Physical Exam  Vitals and nursing note reviewed. Constitutional:       General: She is not in acute distress. Appearance: Normal appearance. She is normal weight. She is not diaphoretic. HENT:      Head: Normocephalic and atraumatic. Right Ear: External ear normal.      Left Ear: External ear normal.      Nose: Nose normal. No congestion or rhinorrhea. Mouth/Throat:      Pharynx: No oropharyngeal exudate. Eyes:      General: No scleral icterus. Right eye: No discharge.          Left eye: No discharge. Conjunctiva/sclera: Conjunctivae normal.   Cardiovascular:      Rate and Rhythm: Normal rate and regular rhythm. Pulses: Normal pulses. Heart sounds: Normal heart sounds. No murmur heard. Pulmonary:      Effort: Pulmonary effort is normal. No respiratory distress. Breath sounds: Normal breath sounds. No wheezing, rhonchi or rales. Abdominal:      General: Bowel sounds are normal. There is no distension. Palpations: Abdomen is soft. Tenderness: There is no abdominal tenderness. There is no guarding. Musculoskeletal:         General: No swelling or tenderness. Normal range of motion. Cervical back: Normal range of motion and neck supple. No rigidity or tenderness. Right lower leg: No edema. Left lower leg: No edema. Skin:     General: Skin is warm and dry. Coloration: Skin is not jaundiced or pale. Neurological:      Mental Status: She is alert. She is disoriented. BP (!) 155/74   Pulse 82   Temp 97.3 °F (36.3 °C)   Resp 18   Wt 161 lb (73 kg)   SpO2 95%   BMI 30.42 kg/m²     Assessment/Plan      1. Chest pain, unspecified type   No further episodes  To f/u with Dr. Chloé León - Cardiology in [place]  Continue ASA and NTG prn   2. Dementia with behavioral disturbance, unspecified dementia type ([facility] [address]   Psych meds per Behavioral Health in [place] to be tearful much of the time  Continue to monitor   3. Major depression, recurrent, chronic (HCC)   Continue current meds  Continue to monitor   4. Recurrent falls   Safety and fall precautions       Patient seen and examined. History partially obtained by chart review and nursing notes. I have reviewed patient's past medical, surgical, social, and family history and have made updates where appropriate. See facility EMR for updated medication list.      Resident has Alzheimer's, DM II, depression, frequent falls, and these are expected to last 12 or more months.   These chronic conditions place the resident at a significant risk of death, acute exacerbation, or functional decline. The patient's comprehensive plan was monitored today. I spent 20 minutes reviewing the plan. I spent >30 minutes in chart and medication review and discussion of plan of care with staff.      Electronically signed by TYLER Glass CNP on 8/23/2021 at 4:01 PM

## 2021-09-09 ENCOUNTER — OUTSIDE SERVICES (OUTPATIENT)
Dept: FAMILY MEDICINE CLINIC | Age: 86
End: 2021-09-09
Payer: MEDICARE

## 2021-09-09 VITALS
TEMPERATURE: 97 F | WEIGHT: 164.8 LBS | RESPIRATION RATE: 18 BRPM | SYSTOLIC BLOOD PRESSURE: 127 MMHG | DIASTOLIC BLOOD PRESSURE: 89 MMHG | HEART RATE: 81 BPM | BODY MASS INDEX: 31.14 KG/M2 | OXYGEN SATURATION: 94 %

## 2021-09-09 DIAGNOSIS — E11.21 TYPE 2 DIABETES MELLITUS WITH DIABETIC NEPHROPATHY, WITHOUT LONG-TERM CURRENT USE OF INSULIN (HCC): ICD-10-CM

## 2021-09-09 DIAGNOSIS — F41.9 ANXIETY: ICD-10-CM

## 2021-09-09 DIAGNOSIS — F03.91 DEMENTIA WITH BEHAVIORAL DISTURBANCE, UNSPECIFIED DEMENTIA TYPE: Primary | ICD-10-CM

## 2021-09-09 DIAGNOSIS — E78.5 HYPERLIPIDEMIA, UNSPECIFIED HYPERLIPIDEMIA TYPE: ICD-10-CM

## 2021-09-09 DIAGNOSIS — F33.9 MAJOR DEPRESSION, RECURRENT, CHRONIC (HCC): ICD-10-CM

## 2021-09-09 DIAGNOSIS — R29.6 RECURRENT FALLS: ICD-10-CM

## 2021-09-09 DIAGNOSIS — E03.9 HYPOTHYROIDISM, UNSPECIFIED TYPE: ICD-10-CM

## 2021-09-09 DIAGNOSIS — G47.9 TROUBLE IN SLEEPING: ICD-10-CM

## 2021-09-09 PROCEDURE — 99309 SBSQ NF CARE MODERATE MDM 30: CPT | Performed by: NURSE PRACTITIONER

## 2021-09-09 PROCEDURE — 99490 CHRNC CARE MGMT STAFF 1ST 20: CPT | Performed by: NURSE PRACTITIONER

## 2021-09-09 ASSESSMENT — ENCOUNTER SYMPTOMS
SHORTNESS OF BREATH: 0
COUGH: 0

## 2021-09-09 NOTE — PROGRESS NOTES
Ambika Acuna is a 80 y.o. female who presents today for her medical conditions/complaints as noted below. Chief Complaint   Patient presents with    1 Month Follow-Up           HPI:     Marisela Herrmann is seen today for her monthly chronic illness f/u. She has a PMX of dementia, frequent falls, DM II, depression, anxiety, HLD, hypothyroid, among others. She is sitting in her recliner in her room. She is crying when I enter. She cries much of the time. She is followed by Psych in [facility]. She recently had labs drawn and was found to have very elevated B56 and Folic acid, orders given. She denies pain, cough, or shortness of breath. No distress noted on room air. She is eating and sleeping well. She gained 3 lbs since last month. She is oriented to self only, she rarely leaves her room.             Past Medical History:   Diagnosis Date    Atrial flutter ([address]     Depression     Diabetes mellitus ([address]     Diverticulitis     Hx of blood clots     Hyperlipidemia     Macular degeneration     Subarachnoid hemorrhage following injury, concussion ([address] 12/25/2012    Subdural hematoma ([address]     Thyroid disease       Past Surgical History:   Procedure Laterality Date    COLONOSCOPY      ENDOSCOPY, COLON, DIAGNOSTIC      EYE SURGERY      cataracts    HYSTERECTOMY      JOINT REPLACEMENT      left hip/rightknee    VASCULAR SURGERY      vein stripping 1972       Family History   Problem Relation Age of Onset    Asthma Mother         hayfever    Breast Cancer Mother     Cancer Mother         stomach    Hearing Loss Father     Vision Loss Father     Cancer Sister         ovarian or uterine and lung    Birth Defects Brother     Early Death Brother    Lidia Milligan Vision Loss Brother     Diabetes Other    [de-identified] / Stillbirths Daughter     Alcohol Abuse Neg Hx     Allergy (Severe) Neg Hx     Anemia Neg Hx     Arthritis Neg Hx     Arrhythmia Neg Hx     Atrial Fibrillation Neg Hx     Coronary Art Dis Neg Hx  Colon Cancer Neg Hx     Depression Neg Hx     Heart Attack Neg Hx     Heart Disease Neg Hx     High Blood Pressure Neg Hx     High Cholesterol Neg Hx     Kidney Disease Neg Hx     Learning Disabilities Neg Hx     Mental Illness Neg Hx     Prostate Cancer Neg Hx     Obesity Neg Hx     Stroke Neg Hx     Substance Abuse Neg Hx     Osteoporosis Neg Hx        Social History     Tobacco Use    Smoking status: Never Smoker    Smokeless tobacco: Never Used   Substance Use Topics    Alcohol use: No      Allergies   Allergen Reactions    Ciprofloxacin Other (See Comments)     Possible intolerance --altered mental status    Flagyl [Metronidazole] Other (See Comments)     Possible intolerance--altered mental status    Neomycin     Pcn [Penicillins]      hives    Propoxyphene        Health Maintenance   Topic Date Due    COVID-19 Vaccine (1) Never done    DTaP/Tdap/Td vaccine (1 - Tdap) Never done    Shingles Vaccine (1 of 2) Never done    Pneumococcal 65+ years Vaccine (2 of 2 - PPSV23) 03/11/2019    TSH testing  03/23/2019    Annual Wellness Visit (AWV)  Never done    Flu vaccine (1) 09/01/2021    Hepatitis A vaccine  Aged Out    Hib vaccine  Aged Out    Meningococcal (ACWY) vaccine  Aged Out       Subjective:      Review of Systems   Unable to perform ROS: Dementia (Limited)   Respiratory: Negative for cough and shortness of breath. Cardiovascular: Negative for chest pain and leg swelling. Objective:     Physical Exam  Vitals and nursing note reviewed. Constitutional:       General: She is not in acute distress. Appearance: Normal appearance. She is normal weight. She is not diaphoretic. HENT:      Head: Normocephalic and atraumatic. Right Ear: External ear normal.      Left Ear: External ear normal.      Nose: Nose normal. No congestion or rhinorrhea. Mouth/Throat:      Pharynx: No oropharyngeal exudate. Eyes:      General: No scleral icterus.         Right eye: No discharge. Left eye: No discharge. Conjunctiva/sclera: Conjunctivae normal.   Cardiovascular:      Rate and Rhythm: Normal rate and regular rhythm. Pulses: Normal pulses. Heart sounds: Normal heart sounds. No murmur heard. Pulmonary:      Effort: Pulmonary effort is normal. No respiratory distress. Breath sounds: Normal breath sounds. No wheezing, rhonchi or rales. Abdominal:      General: Bowel sounds are normal. There is no distension. Palpations: Abdomen is soft. Tenderness: There is no abdominal tenderness. There is no guarding. Musculoskeletal:         General: No swelling or tenderness. Normal range of motion. Cervical back: Normal range of motion and neck supple. No rigidity or tenderness. No muscular tenderness. Right lower leg: No edema. Left lower leg: No edema. Skin:     General: Skin is warm and dry. Coloration: Skin is not jaundiced or pale. Neurological:      Mental Status: She is alert. Mental status is at baseline. She is disoriented. Psychiatric:         Mood and Affect: Mood normal.         Behavior: Behavior normal.       /89   Pulse 81   Temp 97 °F (36.1 °C)   Resp 18   Wt 164 lb 12.8 oz (74.8 kg)   SpO2 94%   BMI 31.14 kg/m²     Assessment/Plan      1. Late onset Alzheimer's disease with behavioral disturbance ([facility] [address]   Continue meds as ordered per Shraddha Galicia CNP for psych  All medications for mental health to be ordered per her  Slow decline continues  Benztropine has been weaned off   2. Type 2 diabetes mellitus with diabetic nephropathy, without long-term current use of insulin (HCC)   Controlled - on no meds  Monitor blood sugars and labs   3. Atypical atrial flutter (HCC)   Controlled rate - regular - monitor   4. Diverticulitis   No s/s at present - monitor   5. Frequent falls   Safety and fall precautions    6. Anxiety   Continue Effexor per Psych CNP   7.  Depression, unspecified depression type   As above   8. Hypothyroidism, unspecified type   Continue Levothyroxine   Follow labs   9. Subdural hematoma (HCC) -  Hx of     Patient seen and examined. History partially obtained by chart review and nursing notes. I have reviewed patient's past medical, surgical, social, and family history and have made updates where appropriate. See facility EMR for updated medication list.      Resident has Dementia, depression, anxiety, HTN, Hypothyroidism, and these are expected to last 12 or more months. These chronic conditions place the resident at a significant risk of death, acute exacerbation, or functional decline. The patient's comprehensive plan was monitored today. I spent 20 minutes reviewing the plan.       Electronically signed by TYLER Medrano CNP on 9/9/2021 at 4:26 PM

## 2021-10-11 ENCOUNTER — OUTSIDE SERVICES (OUTPATIENT)
Dept: FAMILY MEDICINE CLINIC | Age: 86
End: 2021-10-11
Payer: MEDICARE

## 2021-10-11 VITALS
TEMPERATURE: 98.2 F | HEART RATE: 91 BPM | BODY MASS INDEX: 30.65 KG/M2 | OXYGEN SATURATION: 94 % | DIASTOLIC BLOOD PRESSURE: 66 MMHG | WEIGHT: 162.2 LBS | SYSTOLIC BLOOD PRESSURE: 117 MMHG | RESPIRATION RATE: 18 BRPM

## 2021-10-11 DIAGNOSIS — R29.6 RECURRENT FALLS: Primary | ICD-10-CM

## 2021-10-11 DIAGNOSIS — F33.9 MAJOR DEPRESSION, RECURRENT, CHRONIC (HCC): ICD-10-CM

## 2021-10-11 DIAGNOSIS — F03.91 DEMENTIA WITH BEHAVIORAL DISTURBANCE, UNSPECIFIED DEMENTIA TYPE: ICD-10-CM

## 2021-10-11 DIAGNOSIS — F41.9 ANXIETY: ICD-10-CM

## 2021-10-11 DIAGNOSIS — G47.9 TROUBLE IN SLEEPING: ICD-10-CM

## 2021-10-11 PROCEDURE — 99309 SBSQ NF CARE MODERATE MDM 30: CPT | Performed by: NURSE PRACTITIONER

## 2021-10-11 NOTE — PROGRESS NOTES
Joce Contreras is a 80 y.o. female who presents today for her medical conditions/complaints as noted below. Chief Complaint   Patient presents with    Other     Covid +           HPI:     Corina Temple is seen today as a follow up for + covid. She has been in isolation for 10 days and is currently symptom free. She has done well from the covid standpoint and can move to the [facility]. She had a fall this AM and did hit her head, she has a very small laceration near the left ear, this does not need sutured. She also has a hematoma on the left forehead, facility will continue to monitor. She has a hx of frequent falls. She is oriented to self only. She is anxious and afraid much of the time, but able to be redirected. She is followed by Psych in [facility]. She is noted to have tongue dyskinesia with rapid movement of her tongue and licking her lips. She denies pain, cough, or shortness of breath. No distress noted, other than crying and stating that she is afraid. Nursing denies concerns.       Past Medical History:   Diagnosis Date    Atrial flutter ([address]     Depression     Diabetes mellitus ([address]     Diverticulitis     Hx of blood clots     Hyperlipidemia     Macular degeneration     Subarachnoid hemorrhage following injury, concussion ([address] 12/25/2012    Subdural hematoma (HCC)     Thyroid disease       Past Surgical History:   Procedure Laterality Date    COLONOSCOPY      ENDOSCOPY, COLON, DIAGNOSTIC      EYE SURGERY      cataracts    HYSTERECTOMY      JOINT REPLACEMENT      left hip/rightknee    VASCULAR SURGERY      vein stripping 1972       Family History   Problem Relation Age of Onset    Asthma Mother         hayfever    Breast Cancer Mother     Cancer Mother         stomach    Hearing Loss Father     Vision Loss Father     Cancer Sister         ovarian or uterine and lung    Birth Defects Brother     Early Death Brother     Vision Loss Brother     Diabetes Other     Miscarriages / Stillbirths Daughter     Alcohol Abuse Neg Hx     Allergy (Severe) Neg Hx     Anemia Neg Hx     Arthritis Neg Hx     Arrhythmia Neg Hx     Atrial Fibrillation Neg Hx     Coronary Art Dis Neg Hx     Colon Cancer Neg Hx     Depression Neg Hx     Heart Attack Neg Hx     Heart Disease Neg Hx     High Blood Pressure Neg Hx     High Cholesterol Neg Hx     Kidney Disease Neg Hx     Learning Disabilities Neg Hx     Mental Illness Neg Hx     Prostate Cancer Neg Hx     Obesity Neg Hx     Stroke Neg Hx     Substance Abuse Neg Hx     Osteoporosis Neg Hx        Social History     Tobacco Use    Smoking status: Never Smoker    Smokeless tobacco: Never Used   Substance Use Topics    Alcohol use: No      Allergies   Allergen Reactions    Ciprofloxacin Other (See Comments)     Possible intolerance --altered mental status    Flagyl [Metronidazole] Other (See Comments)     Possible intolerance--altered mental status    Neomycin     Pcn [Penicillins]      hives    Propoxyphene        Health Maintenance   Topic Date Due    COVID-19 Vaccine (1) Never done    DTaP/Tdap/Td vaccine (1 - Tdap) Never done    Shingles Vaccine (1 of 2) Never done    Pneumococcal 65+ years Vaccine (2 of 2 - PPSV23) 03/11/2019    TSH testing  03/23/2019    Annual Wellness Visit (AWV)  Never done    Flu vaccine (1) 09/01/2021    Hepatitis A vaccine  Aged Out    Hib vaccine  Aged Out    Meningococcal (ACWY) vaccine  Aged Out       Subjective:      Review of Systems   Unable to perform ROS: Dementia       Objective:     Physical Exam  Vitals and nursing note reviewed. Constitutional:       General: She is not in acute distress. Appearance: She is normal weight. She is not diaphoretic. HENT:      Head: Normocephalic and atraumatic. Right Ear: External ear normal.      Left Ear: External ear normal.      Nose: Nose normal. No congestion or rhinorrhea. Mouth/Throat:      Pharynx: No oropharyngeal exudate. Eyes:      General: No scleral icterus. Right eye: No discharge. Left eye: No discharge. Extraocular Movements: Extraocular movements intact. Conjunctiva/sclera: Conjunctivae normal.      Pupils: Pupils are equal, round, and reactive to light. Cardiovascular:      Rate and Rhythm: Normal rate and regular rhythm. Pulses: Normal pulses. Heart sounds: Normal heart sounds. Pulmonary:      Effort: Pulmonary effort is normal. No respiratory distress. Breath sounds: Normal breath sounds. No wheezing, rhonchi or rales. Abdominal:      General: Bowel sounds are normal. There is no distension. Palpations: Abdomen is soft. Tenderness: There is no abdominal tenderness. There is no guarding. Musculoskeletal:         General: No swelling or tenderness. Normal range of motion. Cervical back: Normal range of motion and neck supple. No rigidity or tenderness. Right lower leg: No edema. Left lower leg: No edema. Skin:     General: Skin is warm and dry. Coloration: Skin is not jaundiced or pale. Neurological:      Mental Status: She is alert. She is disoriented. /66   Pulse 91   Temp 98.2 °F (36.8 °C)   Resp 18   Wt 162 lb 3.2 oz (73.6 kg)   SpO2 94%   BMI 30.65 kg/m²     Assessment/Plan      1. Recurrent falls   With hematoma to left forehead  Frequent Neuro checks  Fall and safety precautions   2. Dementia with behavioral disturbance, unspecified dementia type [facility])   Fall and safety precautions  Redirect and reorient as able   3. Major depression, recurrent, chronic ([address]   Follows with Colletta Economy  Needs a follow up due to tongue movements and dyskinesias   Continue current meds - changes to be made per them   4. Anxiety   As above #3   5. Trouble in sleeping   Continue Melatonin and Psych meds per orders   6.      + Covid 19   Improved - 10 days of isolation completed   OK to go to [facility]    Patient seen and examined. History partially obtained by chart review and nursing notes. I have reviewed patient's past medical, surgical, social, and family history and have made updates where appropriate.   See facility EMR for updated medication list.       Electronically signed by TYLER Duvall CNP on 10/11/2021 at 2:04 PM

## 2021-10-14 ENCOUNTER — OUTSIDE SERVICES (OUTPATIENT)
Dept: FAMILY MEDICINE CLINIC | Age: 86
End: 2021-10-14
Payer: MEDICARE

## 2021-10-14 DIAGNOSIS — F02.80 LATE ONSET ALZHEIMER'S DISEASE WITHOUT BEHAVIORAL DISTURBANCE (HCC): Primary | ICD-10-CM

## 2021-10-14 DIAGNOSIS — G30.1 LATE ONSET ALZHEIMER'S DISEASE WITHOUT BEHAVIORAL DISTURBANCE (HCC): Primary | ICD-10-CM

## 2021-10-14 DIAGNOSIS — E78.5 HYPERLIPIDEMIA, UNSPECIFIED HYPERLIPIDEMIA TYPE: ICD-10-CM

## 2021-10-14 DIAGNOSIS — E11.21 TYPE 2 DIABETES MELLITUS WITH DIABETIC NEPHROPATHY, WITHOUT LONG-TERM CURRENT USE OF INSULIN (HCC): ICD-10-CM

## 2021-10-14 DIAGNOSIS — E53.8 B12 DEFICIENCY: ICD-10-CM

## 2021-10-14 DIAGNOSIS — F02.818 LATE ONSET ALZHEIMER'S DEMENTIA WITH BEHAVIORAL DISTURBANCE (HCC): ICD-10-CM

## 2021-10-14 DIAGNOSIS — G30.1 LATE ONSET ALZHEIMER'S DEMENTIA WITH BEHAVIORAL DISTURBANCE (HCC): ICD-10-CM

## 2021-10-14 DIAGNOSIS — R29.6 RECURRENT FALLS: ICD-10-CM

## 2021-10-14 PROCEDURE — 99309 SBSQ NF CARE MODERATE MDM 30: CPT | Performed by: FAMILY MEDICINE

## 2021-10-14 ASSESSMENT — ENCOUNTER SYMPTOMS
COUGH: 0
SHORTNESS OF BREATH: 0

## 2021-10-14 NOTE — PROGRESS NOTES
Kristen Baugh is a 80 y.o. female who presents today for her medical conditions/complaints as noted below. Chief Complaint   Patient presents with    1 Month Follow-Up     routine    Dementia           HPI:     Juan Wyman is seen today for her monthly chronic illness f/u. She has a PMX of dementia, frequent falls, DM II, depression, anxiety, HLD, hypothyroid, among others. She is seen while sitting in her chair. Her chair has been moved by the door to her room, near nurses station as she had a fall this morning. Per nursing, she was very restless and unable to be redirected. Staff had to give her 1 on 1 attention to prevent her from falling. Nursing contacted her psych NP and given orders to give additional 50mg seroquel now (given about 30 min prior to me examining patient) and increased evening Seroquel dosing to 200mg po qHS. Also given order to check urine. Nursing note patient has been more restless lately and has had increase in falls as a result. Seroquel extra dose given this morning seems to be starting to help patient relax now   She is alert and oriented to self only. She denies pain, cough, or shortness of breath or other complaints.           Past Medical History:   Diagnosis Date    Atrial flutter ([address]     Depression     Diabetes mellitus ([address]     Diverticulitis     Hx of blood clots     Hyperlipidemia     Macular degeneration     Subarachnoid hemorrhage following injury, concussion ([address] 12/25/2012    Subdural hematoma (HCC)     Thyroid disease       Past Surgical History:   Procedure Laterality Date    COLONOSCOPY      ENDOSCOPY, COLON, DIAGNOSTIC      EYE SURGERY      cataracts    HYSTERECTOMY      JOINT REPLACEMENT      left hip/rightknee    VASCULAR SURGERY      vein stripping 1972       Family History   Problem Relation Age of Onset    Asthma Mother         hayfever    Breast Cancer Mother     Cancer Mother         stomach    Hearing Loss Father     Vision Loss Father  Cancer Sister         ovarian or uterine and lung    Birth Defects Brother     Early Death Brother     Vision Loss Brother     Diabetes Other     [de-identified] / Stillbirths Daughter     Alcohol Abuse Neg Hx     Allergy (Severe) Neg Hx     Anemia Neg Hx     Arthritis Neg Hx     Arrhythmia Neg Hx     Atrial Fibrillation Neg Hx     Coronary Art Dis Neg Hx     Colon Cancer Neg Hx     Depression Neg Hx     Heart Attack Neg Hx     Heart Disease Neg Hx     High Blood Pressure Neg Hx     High Cholesterol Neg Hx     Kidney Disease Neg Hx     Learning Disabilities Neg Hx     Mental Illness Neg Hx     Prostate Cancer Neg Hx     Obesity Neg Hx     Stroke Neg Hx     Substance Abuse Neg Hx     Osteoporosis Neg Hx        Social History     Tobacco Use    Smoking status: Never Smoker    Smokeless tobacco: Never Used   Substance Use Topics    Alcohol use: No      Allergies   Allergen Reactions    Ciprofloxacin Other (See Comments)     Possible intolerance --altered mental status    Flagyl [Metronidazole] Other (See Comments)     Possible intolerance--altered mental status    Neomycin     Pcn [Penicillins]      hives    Propoxyphene        Health Maintenance   Topic Date Due    COVID-19 Vaccine (1) Never done    DTaP/Tdap/Td vaccine (1 - Tdap) Never done    Shingles Vaccine (1 of 2) Never done    Pneumococcal 65+ years Vaccine (2 of 2 - PPSV23) 03/11/2019    TSH testing  03/23/2019    Annual Wellness Visit (AWV)  Never done    Flu vaccine (1) 09/01/2021    Hepatitis A vaccine  Aged Out    Hib vaccine  Aged Out    Meningococcal (ACWY) vaccine  Aged Out       Subjective:      Review of Systems   Unable to perform ROS: Dementia (Limited)   Respiratory: Negative for cough and shortness of breath. Cardiovascular: Negative for chest pain. Psychiatric/Behavioral: Positive for confusion.        Objective:     Temperature: 96.1 °F 10/14/2021 10:54 AM  Pulse: 98 per minute 10/14/2021 11:18 AM  Respirations: 18 per minute 10/14/2021 11:00 AM  Blood Pressure: 105 / 58 mmHg 10/14/2021 11:18 AM  O2 Saturation: 97 % 10/14/2021 10:54 AM  Blood Sugar: 161 mg/dL 10/14/2021 05:49 AM  Weight: 162.2 lbs / Routine       BMI: 31.67 10/01/2021 01:22 PM  Height: 5ft 0.0in 08/08/2021 06:13 AM    Physical Exam  Vitals and nursing note reviewed. Constitutional:       General: She is not in acute distress. Appearance: Normal appearance. She is normal weight. She is not diaphoretic. HENT:      Head: Normocephalic and atraumatic. Comments: Bruising around eyes and left forehead from recent fall. Right Ear: External ear normal.      Left Ear: External ear normal.      Nose: Nose normal. No congestion or rhinorrhea. Mouth/Throat:      Pharynx: No oropharyngeal exudate. Eyes:      General: No scleral icterus. Right eye: No discharge. Left eye: No discharge. Conjunctiva/sclera: Conjunctivae normal.   Cardiovascular:      Rate and Rhythm: Normal rate and regular rhythm. Pulses: Normal pulses. Heart sounds: Normal heart sounds. No murmur heard. Pulmonary:      Effort: Pulmonary effort is normal. No respiratory distress. Breath sounds: Normal breath sounds. No wheezing, rhonchi or rales. Abdominal:      General: Bowel sounds are normal. There is no distension. Palpations: Abdomen is soft. Tenderness: There is no abdominal tenderness. There is no guarding. Musculoskeletal:         General: No swelling or tenderness. Normal range of motion. Cervical back: Normal range of motion and neck supple. No rigidity or tenderness. No muscular tenderness. Right lower leg: No edema. Left lower leg: No edema. Skin:     General: Skin is warm and dry. Coloration: Skin is not jaundiced or pale. Neurological:      Mental Status: She is alert. Mental status is at baseline. She is disoriented.    Psychiatric:         Mood and Affect: Mood normal. Behavior: Behavior normal.         Cognition and Memory: Cognition is impaired. Memory is impaired. She exhibits impaired recent memory and impaired remote memory. Assessment/Plan      1. Late onset Alzheimer's disease with behavioral disturbance ([facility]   Continue meds as ordered per Fiorella Fletcher CNP for psych  All medications for mental health to be ordered per her  Slow decline continues     2. Type 2 diabetes mellitus with diabetic nephropathy, without long-term current use of insulin (HCC)   Controlled - on no meds  Monitor blood sugars and labs  A1c 6.2% on 5/2021 and controlled. Check a1c at least q 6 mos. 3. Atypical atrial flutter (HCC)   Controlled rate - regular - monitor     4. Diverticulitis h/o  No s/s at present - monitor     5. Frequent falls   Safety and fall precautions   Related to her dementia. Psych NP increased seroquel dose today     6. Anxiety   Continue Effexor, benztropine, seroquel per Psych CNP     7. Depression, unspecified depression type   As above     8. Hypothyroidism, unspecified type   Continue Levothyroxine   Follow labs     9 b12 deficiency- resolved on last check a year ago. History of. Recheck yearly in Nov.   10. Hyperlipidemia- check fasting lipids q November. Pending results, reasonable to stop statin given age and co morbidities and limited benefit at this point. Patient seen and examined. History partially obtained by chart review and nursing notes. I have reviewed patient's past medical, surgical, social, and family history and have made updates where appropriate.   See facility EMR for updated medication list.

## 2021-10-18 ENCOUNTER — OUTSIDE SERVICES (OUTPATIENT)
Dept: FAMILY MEDICINE CLINIC | Age: 86
End: 2021-10-18
Payer: MEDICARE

## 2021-10-18 VITALS
RESPIRATION RATE: 16 BRPM | WEIGHT: 162.2 LBS | HEART RATE: 69 BPM | SYSTOLIC BLOOD PRESSURE: 131 MMHG | TEMPERATURE: 97.7 F | DIASTOLIC BLOOD PRESSURE: 77 MMHG | BODY MASS INDEX: 30.65 KG/M2 | OXYGEN SATURATION: 96 %

## 2021-10-18 DIAGNOSIS — R29.6 RECURRENT FALLS: ICD-10-CM
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
## 2021-10-18 DIAGNOSIS — F33.9 MAJOR DEPRESSION, RECURRENT, CHRONIC (HCC): ICD-10-CM

## 2021-10-18 DIAGNOSIS — G30.1 LATE ONSET ALZHEIMER'S DEMENTIA WITH BEHAVIORAL DISTURBANCE (HCC): ICD-10-CM

## 2021-10-18 DIAGNOSIS — F41.9 ANXIETY: ICD-10-CM

## 2021-10-18 DIAGNOSIS — F02.818 LATE ONSET ALZHEIMER'S DEMENTIA WITH BEHAVIORAL DISTURBANCE (HCC): ICD-10-CM

## 2021-10-18 PROCEDURE — 99309 SBSQ NF CARE MODERATE MDM 30: CPT | Performed by: NURSE PRACTITIONER

## 2021-10-18 NOTE — PROGRESS NOTES
Ya Colin is a 80 y.o. female who presents today for her medical conditions/complaints as noted below. Chief Complaint   Patient presents with    Other     fall           HPI:     Cira Garcia is seen today for a fall over the weekend. She fell forward hitting her face and forehead. She is noted to have racoon eye bruising and bruising of the left forehead. Per staff, there was no loss of consciousness at the time of the fall, she denied pain at the time, she denies pain in the face, but does state that she has pain at the back of the head. On exam, I am unable to feel any hematoma or red areas, she denied pain with palp over her whole head. She is not able to tell me her name. She is seen while in therapy. She is not able to follow all commands. She does follow a few commands. She continues to work with therapy. Will continue to observe.         Past Medical History:   Diagnosis Date    Atrial flutter ([address]     Depression     Diabetes mellitus ([address]     Diverticulitis     Hx of blood clots     Hyperlipidemia     Macular degeneration     Subarachnoid hemorrhage following injury, concussion ([address] 12/25/2012    Subdural hematoma ([address]     Thyroid disease       Past Surgical History:   Procedure Laterality Date    COLONOSCOPY      ENDOSCOPY, COLON, DIAGNOSTIC      EYE SURGERY      cataracts    HYSTERECTOMY      JOINT REPLACEMENT      left hip/rightknee    VASCULAR SURGERY      vein stripping 1972       Family History   Problem Relation Age of Onset    Asthma Mother         hayfever    Breast Cancer Mother     Cancer Mother         stomach    Hearing Loss Father     Vision Loss Father     Cancer Sister         ovarian or uterine and lung    Birth Defects Brother     Early Death Brother    [place] Vision Loss Brother     Diabetes Other    [de-identified] / Stillbirths Daughter     Alcohol Abuse Neg Hx     Allergy (Severe) Neg Hx     Anemia Neg Hx     Arthritis Neg Hx     Arrhythmia Neg Hx Right eye: No discharge. Left eye: No discharge. Extraocular Movements: Extraocular movements intact. Conjunctiva/sclera: Conjunctivae normal.      Pupils: Pupils are equal, round, and reactive to light. Cardiovascular:      Rate and Rhythm: Normal rate and regular rhythm. Pulses: Normal pulses. Heart sounds: Normal heart sounds. Comments: Distant heart tones  Pulmonary:      Effort: Pulmonary effort is normal. No respiratory distress. Breath sounds: Normal breath sounds. No wheezing, rhonchi or rales. Abdominal:      General: Bowel sounds are normal. There is no distension. Palpations: Abdomen is soft. Tenderness: There is no abdominal tenderness. There is no guarding. Musculoskeletal:         General: No swelling or tenderness. Cervical back: Normal range of motion and neck supple. No rigidity or tenderness. Right lower leg: No edema. Left lower leg: No edema. Comments: Limited ROM due to weakness and frequent falls   Skin:     General: Skin is warm and dry. Coloration: Skin is not jaundiced or pale. Neurological:      Mental Status: She is alert. She is disoriented. Comments: Worsening confusion       /77   Pulse 69   Temp 97.7 °F (36.5 °C)   Resp 16   Wt 162 lb 3.2 oz (73.6 kg)   SpO2 96%   BMI 30.65 kg/m²     Assessment/Plan      1. Fall, initial encounter   Continue to monitor neuro status  Fall and safety precautions  Continue therapies  Frequent neuro checks   2. Recurrent falls   As above   3. Major depression, recurrent, chronic ([facility]   Care and medications per [facility] in [place]  Family will need to determine if Cass Sesay will conitnue here or if we will take over  She continues to decline  She is crying and afraid much of the time   4. Anxiety   Asabove   5. Late onset Alzheimer's dementia with behavioral disturbance ([facility]   Safety and fall precautions       Patient seen and examined.   History partially obtained by chart review and nursing notes. I have reviewed patient's past medical, surgical, social, and family history and have made updates where appropriate.   See facility EMR for updated medication list.       Electronically signed by TYLER Workman CNP on 10/18/2021 at 5:06 PM

## 2021-11-09 ENCOUNTER — OUTSIDE SERVICES (OUTPATIENT)
Dept: FAMILY MEDICINE CLINIC | Age: 86
End: 2021-11-09
Payer: MEDICARE

## 2021-11-09 DIAGNOSIS — F02.80 LATE ONSET ALZHEIMER'S DISEASE WITHOUT BEHAVIORAL DISTURBANCE (HCC): Primary | ICD-10-CM

## 2021-11-09 DIAGNOSIS — F03.91 DEMENTIA WITH BEHAVIORAL DISTURBANCE, UNSPECIFIED DEMENTIA TYPE: ICD-10-CM

## 2021-11-09 DIAGNOSIS — F32.A DEPRESSION, UNSPECIFIED DEPRESSION TYPE: ICD-10-CM

## 2021-11-09 DIAGNOSIS — F33.9 MAJOR DEPRESSION, RECURRENT, CHRONIC (HCC): ICD-10-CM

## 2021-11-09 DIAGNOSIS — I48.4 ATYPICAL ATRIAL FLUTTER (HCC): ICD-10-CM

## 2021-11-09 DIAGNOSIS — E53.8 B12 DEFICIENCY: ICD-10-CM

## 2021-11-09 DIAGNOSIS — E03.9 HYPOTHYROIDISM, UNSPECIFIED TYPE: ICD-10-CM

## 2021-11-09 DIAGNOSIS — G30.1 LATE ONSET ALZHEIMER'S DISEASE WITHOUT BEHAVIORAL DISTURBANCE (HCC): Primary | ICD-10-CM

## 2021-11-09 DIAGNOSIS — R29.6 FREQUENT FALLS: ICD-10-CM

## 2021-11-09 DIAGNOSIS — F41.9 ANXIETY: ICD-10-CM

## 2021-11-09 DIAGNOSIS — E11.21 TYPE 2 DIABETES MELLITUS WITH DIABETIC NEPHROPATHY, WITHOUT LONG-TERM CURRENT USE OF INSULIN (HCC): ICD-10-CM

## 2021-11-09 PROCEDURE — 99309 SBSQ NF CARE MODERATE MDM 30: CPT | Performed by: FAMILY MEDICINE

## 2021-11-09 ASSESSMENT — ENCOUNTER SYMPTOMS
SHORTNESS OF BREATH: 0
COUGH: 0

## 2021-11-09 NOTE — PROGRESS NOTES
John Paul Meier is a 80 y.o. female who presents today for her medical conditions/complaints as noted below. Chief Complaint   Patient presents with    1 Month Follow-Up           HPI:     Donita Loomis is seen today for her monthly chronic illness f/u. She has a PMX of dementia, frequent falls, DM II, depression, anxiety, HLD, hypothyroid, among others. She is seen while sitting in her wheel chair. She has had episodes of increased anxiety. At times is easily redirectable and other times not, despite much effort and one and one. Room is now next to nursing station due to falls and increased confusion and increased anxiety. Psych NP also has been contacted and adjusting meds. Is on 200mg of seroqeul xr in evening now along with lamictal, effexor, and melatonin. She is alert and oriented to self only. She denies pain, cough, or shortness of breath or other complaints.            Past Medical History:   Diagnosis Date    Atrial flutter ([address]     Depression     Diabetes mellitus ([address]     Diverticulitis     Hx of blood clots     Hyperlipidemia     Macular degeneration     Subarachnoid hemorrhage following injury, concussion ([address] 12/25/2012    Subdural hematoma ([address]     Thyroid disease       Past Surgical History:   Procedure Laterality Date    COLONOSCOPY      ENDOSCOPY, COLON, DIAGNOSTIC      EYE SURGERY      cataracts    HYSTERECTOMY      JOINT REPLACEMENT      left hip/rightknee    VASCULAR SURGERY      vein stripping 1972       Family History   Problem Relation Age of Onset    Asthma Mother         hayfever    Breast Cancer Mother     Cancer Mother         stomach    Hearing Loss Father     Vision Loss Father     Cancer Sister         ovarian or uterine and lung    Birth Defects Brother     Early Death Brother    [place]ane [place] Vision Loss Brother     Diabetes Other    Othelia Ministerio / Stillbirths Daughter     Alcohol Abuse Neg Hx     Allergy (Severe) Neg Hx     Anemia Neg Hx     Arthritis Neg Hx     Arrhythmia Neg Hx     Atrial Fibrillation Neg Hx     Coronary Art Dis Neg Hx     Colon Cancer Neg Hx     Depression Neg Hx     Heart Attack Neg Hx     Heart Disease Neg Hx     High Blood Pressure Neg Hx     High Cholesterol Neg Hx     Kidney Disease Neg Hx     Learning Disabilities Neg Hx     Mental Illness Neg Hx     Prostate Cancer Neg Hx     Obesity Neg Hx     Stroke Neg Hx     Substance Abuse Neg Hx     Osteoporosis Neg Hx        Social History     Tobacco Use    Smoking status: Never Smoker    Smokeless tobacco: Never Used   Substance Use Topics    Alcohol use: No      Allergies   Allergen Reactions    Ciprofloxacin Other (See Comments)     Possible intolerance --altered mental status    Flagyl [Metronidazole] Other (See Comments)     Possible intolerance--altered mental status    Neomycin     Pcn [Penicillins]      hives    Propoxyphene        Health Maintenance   Topic Date Due    COVID-19 Vaccine (1) Never done    DTaP/Tdap/Td vaccine (1 - Tdap) Never done    Shingles Vaccine (1 of 2) Never done    Pneumococcal 65+ years Vaccine (2 of 2 - PPSV23) 03/11/2019    TSH testing  03/23/2019    Annual Wellness Visit (AWV)  Never done    Flu vaccine (1) 09/01/2021    Hepatitis A vaccine  Aged Out    Hib vaccine  Aged Out    Meningococcal (ACWY) vaccine  Aged Out       Subjective:      Review of Systems   Unable to perform ROS: Dementia (Limited)   Respiratory: Negative for cough and shortness of breath. Cardiovascular: Negative for chest pain. Psychiatric/Behavioral: Positive for confusion.        Objective:     Temperature: 97.6 °F 11/09/2021 06:42 AM  Pulse: 95 per minute 11/08/2021 02:41 PM  Respirations: 20 per minute 11/08/2021 02:41 PM  Blood Pressure: 130 / 70 mmHg 11/08/2021 02:41 PM  O2 Saturation: 95 % 11/09/2021 06:42 AM  Blood Sugar: 105 mg/dL 11/08/2021 05:26 AM  Weight: 163.6 lbs / Routine       BMI: 31.95 11/01/2021 09:58 AM    Physical Exam  Vitals and nursing note reviewed. Constitutional:       General: She is not in acute distress. Appearance: Normal appearance. She is normal weight. She is not diaphoretic. HENT:      Head: Normocephalic and atraumatic. Comments: Resolving bruising below eyes      Right Ear: External ear normal.      Left Ear: External ear normal.      Nose: Nose normal. No congestion or rhinorrhea. Mouth/Throat:      Pharynx: No oropharyngeal exudate. Eyes:      General: No scleral icterus. Right eye: No discharge. Left eye: No discharge. Conjunctiva/sclera: Conjunctivae normal.   Cardiovascular:      Rate and Rhythm: Normal rate and regular rhythm. Pulses: Normal pulses. Heart sounds: Normal heart sounds. No murmur heard. Pulmonary:      Effort: Pulmonary effort is normal. No respiratory distress. Breath sounds: Normal breath sounds. No wheezing, rhonchi or rales. Abdominal:      General: Bowel sounds are normal. There is no distension. Palpations: Abdomen is soft. Tenderness: There is no abdominal tenderness. There is no guarding. Musculoskeletal:         General: No swelling or tenderness. Normal range of motion. Cervical back: Normal range of motion and neck supple. No rigidity or tenderness. No muscular tenderness. Right lower leg: No edema. Left lower leg: No edema. Skin:     General: Skin is warm and dry. Coloration: Skin is not jaundiced or pale. Neurological:      Mental Status: She is alert. Mental status is at baseline. She is disoriented. Psychiatric:         Mood and Affect: Mood normal.         Behavior: Behavior normal.         Cognition and Memory: Cognition is impaired. Memory is impaired. She exhibits impaired recent memory and impaired remote memory. Assessment/Plan      1.  Late onset Alzheimer's disease with behavioral disturbance ([facility] [address]   Continue meds as ordered per Roldan Cool CNP for psych  Seroquel, lamictal, and melatonin for agitation/anxiety and sleep. All medications for mental health to be ordered per her  Slow decline continues     2. Type 2 diabetes mellitus with diabetic nephropathy, without long-term current use of insulin (HCC)   Controlled - on no meds  Monitor blood sugars and labs  A1c 6.6% on 11/2021 and controlled. Check a1c at least q 6 mos. 3. Atypical atrial flutter (HCC)   Controlled rate - regular - monitor     4. Diverticulitis h/o  No s/s recently- monitor     5. Frequent falls   Safety and fall precautions   Related to her dementia. Psych NP increased seroquel dose today     6. Anxiety   Continue Effexor, lamictal, seroquel per Psych CNP     7. Depression, unspecified depression type   As above     8. Hypothyroidism, unspecified type   Continue Levothyroxine   Follow labs-- Next in May     9 b12 deficiency- resolved on last check a year ago. History of. Recheck yearly in Nov.     10. Hyperlipidemia- check fasting lipids q November. Recent lipids reviewed and OK       Patient seen and examined. History partially obtained by chart review and nursing notes. I have reviewed patient's past medical, surgical, social, and family history and have made updates where appropriate.   See facility EMR for updated medication list.

## 2021-12-14 ENCOUNTER — OUTSIDE SERVICES (OUTPATIENT)
Dept: FAMILY MEDICINE CLINIC | Age: 86
End: 2021-12-14
Payer: MEDICARE

## 2021-12-14 VITALS
WEIGHT: 163 LBS | BODY MASS INDEX: 30.8 KG/M2 | RESPIRATION RATE: 16 BRPM | DIASTOLIC BLOOD PRESSURE: 72 MMHG | HEART RATE: 62 BPM | OXYGEN SATURATION: 95 % | TEMPERATURE: 96.8 F | SYSTOLIC BLOOD PRESSURE: 133 MMHG

## 2021-12-14 DIAGNOSIS — R29.6 FREQUENT FALLS: ICD-10-CM

## 2021-12-14 DIAGNOSIS — E53.8 B12 DEFICIENCY: ICD-10-CM

## 2021-12-14 DIAGNOSIS — E78.5 HYPERLIPIDEMIA, UNSPECIFIED HYPERLIPIDEMIA TYPE: ICD-10-CM

## 2021-12-14 DIAGNOSIS — G47.9 TROUBLE IN SLEEPING: ICD-10-CM

## 2021-12-14 DIAGNOSIS — F03.91 DEMENTIA WITH BEHAVIORAL DISTURBANCE, UNSPECIFIED DEMENTIA TYPE: Primary | ICD-10-CM

## 2021-12-14 DIAGNOSIS — E03.9 HYPOTHYROIDISM, UNSPECIFIED TYPE: ICD-10-CM

## 2021-12-14 DIAGNOSIS — E11.21 TYPE 2 DIABETES MELLITUS WITH DIABETIC NEPHROPATHY, WITHOUT LONG-TERM CURRENT USE OF INSULIN (HCC): ICD-10-CM

## 2021-12-14 DIAGNOSIS — F41.9 ANXIETY: ICD-10-CM

## 2021-12-14 DIAGNOSIS — F33.9 MAJOR DEPRESSION, RECURRENT, CHRONIC (HCC): ICD-10-CM

## 2021-12-14 PROCEDURE — 99309 SBSQ NF CARE MODERATE MDM 30: CPT | Performed by: NURSE PRACTITIONER

## 2021-12-14 PROCEDURE — 99490 CHRNC CARE MGMT STAFF 1ST 20: CPT | Performed by: NURSE PRACTITIONER

## 2021-12-14 NOTE — PROGRESS NOTES
Michaelle Meza is a 80 y.o. female who presents today for her medical conditions/complaints as noted below. Chief Complaint   Patient presents with    1 Month Follow-Up           HPI:     Sae Blakely is seen today for her monthly chronic illness f/u. She has a PMX of dementia with behaviours, frequent falls, DM II, depression, anxiety, HLD, hypothyroid, among others. She is alert and oriented to self only. She is seen while sitting in chair in her room. She is happy and smiling at the time of exam.  Per staff, she has had more behaviors lately, Psych CNP was called for orders. She denies pain or shortness of breath. No distress noted on room air. Weight has been stable for last few months.           Past Medical History:   Diagnosis Date    Atrial flutter ([address]     Depression     Diabetes mellitus ([address]     Diverticulitis     Hx of blood clots     Hyperlipidemia     Macular degeneration     Subarachnoid hemorrhage following injury, concussion ([address] 12/25/2012    Subdural hematoma ([address]     Thyroid disease       Past Surgical History:   Procedure Laterality Date    COLONOSCOPY      ENDOSCOPY, COLON, DIAGNOSTIC      EYE SURGERY      cataracts    HYSTERECTOMY      JOINT REPLACEMENT      left hip/rightknee    VASCULAR SURGERY      vein stripping 1972       Family History   Problem Relation Age of Onset    Asthma Mother         hayfever    Breast Cancer Mother     Cancer Mother         stomach    Hearing Loss Father     Vision Loss Father     Cancer Sister         ovarian or uterine and lung    Birth Defects Brother     Early Death Brother    [place] Vision Loss Brother     Diabetes Other    Jarad Duryea / Stillbirths Daughter     Alcohol Abuse Neg Hx     Allergy (Severe) Neg Hx     Anemia Neg Hx     Arthritis Neg Hx     Arrhythmia Neg Hx     Atrial Fibrillation Neg Hx     Coronary Art Dis Neg Hx     Colon Cancer Neg Hx     Depression Neg Hx     Heart Attack Neg Hx     Heart Disease Neg Hx  High Blood Pressure Neg Hx     High Cholesterol Neg Hx     Kidney Disease Neg Hx     Learning Disabilities Neg Hx     Mental Illness Neg Hx     Prostate Cancer Neg Hx     Obesity Neg Hx     Stroke Neg Hx     Substance Abuse Neg Hx     Osteoporosis Neg Hx        Social History     Tobacco Use    Smoking status: Never Smoker    Smokeless tobacco: Never Used   Substance Use Topics    Alcohol use: No      Allergies   Allergen Reactions    Ciprofloxacin Other (See Comments)     Possible intolerance --altered mental status    Flagyl [Metronidazole] Other (See Comments)     Possible intolerance--altered mental status    Neomycin     Pcn [Penicillins]      hives    Propoxyphene        Health Maintenance   Topic Date Due    COVID-19 Vaccine (1) Never done    DTaP/Tdap/Td vaccine (1 - Tdap) Never done    Shingles Vaccine (1 of 2) Never done    Pneumococcal 65+ years Vaccine (2 of 2 - PPSV23) 03/11/2019    TSH testing  03/23/2019    Annual Wellness Visit (AWV)  Never done    Flu vaccine (1) 09/01/2021    Hepatitis A vaccine  Aged Out    Hib vaccine  Aged Out    Meningococcal (ACWY) vaccine  Aged Out       Subjective:      Review of Systems   Unable to perform ROS: Dementia (Limited)       Objective:     Physical Exam  Vitals and nursing note reviewed. Constitutional:       General: She is not in acute distress. Appearance: Normal appearance. She is normal weight. She is not diaphoretic. HENT:      Head: Normocephalic and atraumatic. Right Ear: External ear normal.      Left Ear: External ear normal.      Nose: Nose normal. No congestion or rhinorrhea. Mouth/Throat:      Mouth: Mucous membranes are moist.      Pharynx: No oropharyngeal exudate. Eyes:      General: No scleral icterus. Right eye: No discharge. Left eye: No discharge. Extraocular Movements: Extraocular movements intact.       Conjunctiva/sclera: Conjunctivae normal.   Cardiovascular:      Rate and Rhythm: Normal rate and regular rhythm. Pulses: Normal pulses. Heart sounds: Normal heart sounds. No murmur heard. Pulmonary:      Effort: Pulmonary effort is normal. No respiratory distress. Breath sounds: Normal breath sounds. No wheezing, rhonchi or rales. Abdominal:      General: Bowel sounds are normal. There is no distension. Palpations: Abdomen is soft. Tenderness: There is no abdominal tenderness. There is no guarding. Musculoskeletal:         General: No swelling or tenderness. Normal range of motion. Cervical back: Normal range of motion and neck supple. No rigidity or tenderness. No muscular tenderness. Right lower leg: No edema. Left lower leg: No edema. Skin:     General: Skin is warm and dry. Coloration: Skin is not jaundiced or pale. Neurological:      Mental Status: She is alert. Mental status is at baseline. She is disoriented. Psychiatric:         Mood and Affect: Mood normal.         Behavior: Behavior normal.       /72   Pulse 62   Temp 96.8 °F (36 °C)   Resp 16   Wt 163 lb (73.9 kg)   SpO2 95%   BMI 30.80 kg/m²     Assessment/Plan      1. Late onset Alzheimer's disease with behavioral disturbance ([facility] [address]   Continue meds as ordered per Annabella Mortimer CNP for psych  All medications for mental health to be ordered per her  Slow decline continues - oriented to self only   2. Type 2 diabetes mellitus with diabetic nephropathy, without long-term current use of insulin (HCC)   Controlled - on no meds  Monitor blood sugars and labs - blood sugars look good   3. Atypical atrial flutter (HCC)   Controlled rate - regular - continue to monitor   4. Diverticulitis   No s/s at present - monitor   5. Frequent falls   Safety and fall precautions    6. Anxiety   Continue Effexor per Psych CNP   7. Depression, unspecified depression type   As above   8. Hypothyroidism, unspecified type   Continue Levothyroxine   Follow labs   9.  Subdural hematoma ([facility] [address] -  Hx of     Patient seen and examined. History partially obtained by chart review and nursing notes. I have reviewed patient's past medical, surgical, social, and family history and have made updates where appropriate. See facility EMR for updated medication list.      Resident has Dementia, depression, anxiety, HTN, Hypothyroidism, and these are expected to last 12 or more months. These chronic conditions place the resident at a significant risk of death, acute exacerbation, or functional decline. The patient's comprehensive plan was monitored today. I spent 20 minutes reviewing the plan.       Electronically signed by TYLER Lewis CNP on 12/14/2021 at 11:35 AM

## 2022-02-04 PROBLEM — I48.4 ATYPICAL ATRIAL FLUTTER (HCC): Status: ACTIVE | Noted: 2022-02-04

## 2022-10-15 PROBLEM — E78.00 HYPERCHOLESTEROLEMIA: Status: RESOLVED | Noted: 2021-03-11 | Resolved: 2022-10-15

## 2022-10-15 PROBLEM — F41.1 GENERALIZED ANXIETY DISORDER: Status: ACTIVE | Noted: 2022-10-15

## 2022-10-15 PROBLEM — E55.9 VITAMIN D DEFICIENCY: Status: ACTIVE | Noted: 2022-10-15

## 2022-10-15 PROBLEM — Z86.79 HISTORY OF ATRIAL FLUTTER: Status: ACTIVE | Noted: 2022-02-04

## 2022-10-15 PROBLEM — E78.00 HYPERCHOLESTEROLEMIA: Status: ACTIVE | Noted: 2021-03-11

## 2023-02-19 ENCOUNTER — APPOINTMENT (OUTPATIENT)
Dept: GENERAL RADIOLOGY | Age: 88
End: 2023-02-19
Payer: MEDICARE

## 2023-02-19 ENCOUNTER — HOSPITAL ENCOUNTER (EMERGENCY)
Age: 88
Discharge: HOME | End: 2023-02-19
Attending: EMERGENCY MEDICINE
Payer: MEDICARE

## 2023-02-19 VITALS
RESPIRATION RATE: 18 BRPM | WEIGHT: 193.56 LBS | SYSTOLIC BLOOD PRESSURE: 139 MMHG | DIASTOLIC BLOOD PRESSURE: 62 MMHG | HEART RATE: 86 BPM | OXYGEN SATURATION: 96 % | BODY MASS INDEX: 36.57 KG/M2 | TEMPERATURE: 98.7 F

## 2023-02-19 DIAGNOSIS — T17.308A CHOKING, INITIAL ENCOUNTER: Primary | ICD-10-CM

## 2023-02-19 DIAGNOSIS — G30.1 LATE ONSET ALZHEIMER'S DEMENTIA WITH OTHER BEHAVIORAL DISTURBANCE, UNSPECIFIED DEMENTIA SEVERITY (HCC): ICD-10-CM

## 2023-02-19 DIAGNOSIS — F02.818 LATE ONSET ALZHEIMER'S DEMENTIA WITH OTHER BEHAVIORAL DISTURBANCE, UNSPECIFIED DEMENTIA SEVERITY (HCC): ICD-10-CM

## 2023-02-19 LAB
FLUAV RNA RESP QL NAA+PROBE: NOT DETECTED
FLUBV RNA RESP QL NAA+PROBE: NOT DETECTED
SARS-COV-2 RNA RESP QL NAA+PROBE: NOT DETECTED

## 2023-02-19 PROCEDURE — 70360 X-RAY EXAM OF NECK: CPT

## 2023-02-19 PROCEDURE — 71045 X-RAY EXAM CHEST 1 VIEW: CPT

## 2023-02-19 PROCEDURE — 99285 EMERGENCY DEPT VISIT HI MDM: CPT

## 2023-02-19 PROCEDURE — 87636 SARSCOV2 & INF A&B AMP PRB: CPT

## 2023-02-19 PROCEDURE — 93005 ELECTROCARDIOGRAM TRACING: CPT | Performed by: EMERGENCY MEDICINE

## 2023-02-19 ASSESSMENT — ENCOUNTER SYMPTOMS
EYE DISCHARGE: 0
SINUS PRESSURE: 0
RHINORRHEA: 0
PHOTOPHOBIA: 0
EYE PAIN: 0
SHORTNESS OF BREATH: 0
CHOKING: 0
WHEEZING: 0
DIARRHEA: 0
NAUSEA: 0
COUGH: 0
BLOOD IN STOOL: 0
SORE THROAT: 0
EYE ITCHING: 0
BACK PAIN: 0
CONSTIPATION: 0
EYE REDNESS: 0
ABDOMINAL DISTENTION: 0
VOICE CHANGE: 0
CHEST TIGHTNESS: 0
ABDOMINAL PAIN: 0
TROUBLE SWALLOWING: 1
VOMITING: 0

## 2023-02-19 NOTE — ED NOTES
Patient able to swallow applesauce with no sign of choking or difficulty swallowing.       Ruy Sero  02/19/23 1966

## 2023-02-19 NOTE — ED TRIAGE NOTES
Patient presents to ED from the [facility] who report patient may have been choking. EMS reports normal GCS is 13/14. Patient is alert and oriented to self. Patient reports she \"may be sleeping\" and has a hx of dementia. Mucus-like emesis noted.

## 2023-02-19 NOTE — DISCHARGE INSTRUCTIONS
Patient has what appears to be Alzheimer's dementia. Caregivers are instructed to have the patient follow-up with a primary care physician as they may need a swallow study performed. Choking is common in dementia. Caregivers are instructed to give all medications as prescribed. They are instructed to follow-up as directed. They are instructed to return to the emergency room immediately for any new or worsening complaints.

## 2023-02-19 NOTE — ED NOTES
Patient awake in bed asking \"when can I go home\" and tearful. Patient trying to climb out of bed. Posey alarm applied to patients gown.       Sharie Holstein  02/19/23 1214

## 2023-02-19 NOTE — ED PROVIDER NOTES
[facility]  eMERGENCY dEPARTMENT eNCOUnter          CHIEF COMPLAINT       Chief Complaint   Patient presents with    Dementia    Choking       Nurses Notes reviewed and I agree except as noted in the HPI. HISTORY OF PRESENT ILLNESS    Stef Downey is a 80 y.o. female who presents from the [facility] for a possible choking episode. Apparently the nurses state that they took report and they said the patient had choking. She has dementia she has had bouts of choking before. Here today the patient is very pleasant, obviously confused, not having any difficulty swallowing at this time. She is not complaining of any pain. Patient is otherwise resting comfortably on the cot in no apparent distress. P.o. challenge will be attempted. REVIEW OF SYSTEMS     Review of Systems   Constitutional:  Negative for activity change, appetite change, diaphoresis, fatigue and unexpected weight change. HENT:  Positive for trouble swallowing. Negative for congestion, ear discharge, ear pain, hearing loss, rhinorrhea, sinus pressure, sore throat and voice change. Eyes:  Negative for photophobia, pain, discharge, redness and itching. Respiratory:  Negative for cough, choking, chest tightness, shortness of breath and wheezing. Cardiovascular:  Negative for chest pain, palpitations and leg swelling. Gastrointestinal:  Negative for abdominal distention, abdominal pain, blood in stool, constipation, diarrhea, nausea and vomiting. Endocrine: Negative for polydipsia, polyphagia and polyuria. Genitourinary:  Negative for decreased urine volume, difficulty urinating, dysuria, enuresis, frequency, hematuria and urgency. Musculoskeletal:  Negative for arthralgias, back pain, gait problem, myalgias, neck pain and neck stiffness. Skin:  Negative for pallor and rash. Allergic/Immunologic: Negative for immunocompromised state.    Neurological:  Negative for dizziness, tremors, seizures, syncope, facial asymmetry, weakness, light-headedness, numbness and headaches. Hematological:  Negative for adenopathy. Does not bruise/bleed easily. Psychiatric/Behavioral:  Negative for agitation, hallucinations and suicidal ideas. The patient is not nervous/anxious. PAST MEDICAL HISTORY    has a past medical history of Atrial flutter ([address] Depression, Diabetes mellitus ([facility] [address] Diverticulitis, Hx of blood clots, Hyperlipidemia, Macular degeneration, Subarachnoid hemorrhage following injury, concussion, Subdural hematoma, and Thyroid disease. SURGICAL HISTORY      has a past surgical history that includes joint replacement; Hysterectomy; Endoscopy, colon, diagnostic; eye surgery; vascular surgery; and Colonoscopy. CURRENT MEDICATIONS       Discharge Medication List as of 2/19/2023  5:59 PM        CONTINUE these medications which have NOT CHANGED    Details   aspirin 81 MG EC tablet Take 81 mg by mouth dailyHistorical Med      Multiple Vitamins-Minerals (PRESERVISION AREDS 2 PO) Take by mouthHistorical Med      QUEtiapine (SEROQUEL XR) 50 MG extended release tablet Take 50 mg by mouth nightlyHistorical Med      polyethylene glycol (GLYCOLAX) 17 g packet Take 17 g by mouth daily as needed for ConstipationHistorical Med      lamoTRIgine (LAMICTAL) 200 MG tablet Take 200 mg by mouth dailyHistorical Med      acetaminophen (TYLENOL) 325 MG tablet Take 2 tablets by mouth every 4 hours as needed for Pain or Fever, Disp-120 tablet, R-3DC to SNF      melatonin 1 MG tablet Take 3 tablets by mouth nightlyDC to SNF      QUEtiapine (SEROQUEL) 25 MG tablet Take 1 tablet by mouth 2 times daily, Disp-60 tablet, R-0DC to SNF      venlafaxine (EFFEXOR XR) 37.5 MG extended release capsule Take 1 capsule by mouth daily (with breakfast), Disp-30 capsule, R-3DC to SNF      docusate sodium (COLACE, DULCOLAX) 100 MG CAPS Take 100 mg by mouth 2 times daily, Disp-30 capsule, R-0Normal      !!  MISC NATURAL PRODUCTS PO Take by mouth Preservations areds twice per dayHistorical Med      !! MISC NATURAL PRODUCTS PO Take by mouth Premier formula for ocular nutrition twice dailyHistorical Med      folic acid (FOLVITE) 1 MG tablet Take 1 tablet by mouth daily.      levothyroxine (SYNTHROID) 50 MCG tablet Take 50 mcg by mouth Daily.        metformin (GLUCOPHAGE) 500 MG tablet Take 500 mg by mouth 2 times daily (with meals).       !! - Potential duplicate medications found. Please discuss with provider.          ALLERGIES     is allergic to ciprofloxacin, flagyl [metronidazole], neomycin, pcn [penicillins], and propoxyphene.    FAMILY HISTORY     She indicated that the status of her mother is unknown. She indicated that the status of her father is unknown. She indicated that the status of her sister is unknown. She indicated that the status of her brother is unknown. She indicated that the status of her daughter is unknown. She indicated that the status of her other is unknown. She indicated that the status of her neg hx is unknown.   family history includes Asthma in her mother; Birth Defects in her brother; Breast Cancer in her mother; Cancer in her mother and sister; Diabetes in an other family member; Early Death in her brother; Hearing Loss in her father; Miscarriages / Stillbirths in her daughter; Vision Loss in her brother and father.    SOCIAL HISTORY      reports that she has never smoked. She has never used smokeless tobacco. She reports that she does not drink alcohol and does not use drugs.    PHYSICAL EXAM     INITIAL VITALS:  weight is 193 lb 9 oz (87.8 kg). Her oral temperature is 98.7 °F (37.1 °C). Her blood pressure is 139/62 and her pulse is 86. Her respiration is 18 and oxygen saturation is 96%.    Physical Exam  Vitals and nursing note reviewed.   Constitutional:       General: She is not in acute distress.     Appearance: She is well-developed. She is not diaphoretic.   HENT:      Head: Normocephalic and atraumatic.      Right Ear:  External ear normal.      Left Ear: External ear normal.      Nose: Nose normal.      Mouth/Throat:      Lips: Pink. Mouth: Mucous membranes are moist.      Dentition: Normal dentition. Tongue: No lesions. Palate: No mass. Pharynx: Oropharynx is clear. No oropharyngeal exudate. Tonsils: No tonsillar exudate or tonsillar abscesses. 0 on the right. 0 on the left. Comments: No obvious food or foreign bodies in the posterior oropharynx. No stridor with breathing or cough. Eyes:      General: No scleral icterus. Right eye: No discharge. Left eye: No discharge. Conjunctiva/sclera: Conjunctivae normal.      Pupils: Pupils are equal, round, and reactive to light. Neck:      Thyroid: No thyromegaly. Vascular: No JVD. Trachea: No tracheal deviation. Cardiovascular:      Rate and Rhythm: Normal rate and regular rhythm. Pulses:           Carotid pulses are 2+ on the right side and 2+ on the left side. Radial pulses are 2+ on the right side and 2+ on the left side. Femoral pulses are 2+ on the right side and 2+ on the left side. Popliteal pulses are 2+ on the right side and 2+ on the left side. Dorsalis pedis pulses are 2+ on the right side and 2+ on the left side. Posterior tibial pulses are 2+ on the right side and 2+ on the left side. Heart sounds: Normal heart sounds, S1 normal and S2 normal. No murmur heard. No friction rub. No gallop. Pulmonary:      Effort: Pulmonary effort is normal.      Breath sounds: Normal breath sounds. No stridor. No decreased breath sounds, wheezing, rhonchi or rales. Chest:      Chest wall: No tenderness. Abdominal:      General: Bowel sounds are normal. There is no distension. Palpations: Abdomen is soft. There is no mass. Tenderness: There is no abdominal tenderness. There is no guarding or rebound. Hernia: No hernia is present.    Musculoskeletal:         General: No tenderness. Normal range of motion. Cervical back: Normal range of motion and neck supple. Right lower leg: No edema. Left lower leg: No edema. Lymphadenopathy:      Cervical: No cervical adenopathy. Skin:     General: Skin is warm and dry. Findings: No bruising, ecchymosis, lesion or rash. Neurological:      Mental Status: She is alert and oriented to person, place, and time. Cranial Nerves: No cranial nerve deficit. Coordination: Coordination normal.      Deep Tendon Reflexes: Reflexes are normal and symmetric. Psychiatric:         Speech: Speech normal.         Behavior: Behavior normal.         Thought Content: Thought content normal.         Judgment: Judgment normal.         DIFFERENTIAL DIAGNOSIS:   Worsening dementia, transfer dysphagia. Esophageal foreign body    DIAGNOSTIC RESULTS     EKG: All EKG's are interpreted by the Emergency Department Physician who either signs or Co-signs this chart in the absence of a cardiologist.  None    RADIOLOGY: non-plain film images(s) such as CT, Ultrasound and MRI are read by the radiologist.  XR NECK SOFT TISSUE   Final Result   1. No acute finding. **This report has been created using voice recognition software. It may contain minor errors which are inherent in voice recognition technology. **      Final report electronically signed by Dr Conor Huerta on 2/19/2023 5:37 PM      XR CHEST PORTABLE   Final Result   1. No acute cardiopulmonary finding. **This report has been created using voice recognition software. It may contain minor errors which are inherent in voice recognition technology. **      Final report electronically signed by Dr Conor Huerta on 2/19/2023 3:32 PM            LABS:   Labs Reviewed   COVID-19 & INFLUENZA COMBO       EMERGENCY DEPARTMENT COURSE:   Vitals:    Vitals:    02/19/23 1429 02/19/23 1530 02/19/23 1722   BP: (!) 89/35 (!) 103/51 139/62   Pulse: 77 78 86   Resp:  17 18 Temp:  98.7 °F (37.1 °C)    TempSrc: Oral Oral    SpO2:  93% 96%   Weight: 193 lb 9 oz (87.8 kg)       Patient was assessed at bedside Labs and imaging ordered. Here today I Reviewed the labs and imaging. All within normal limits. Patient was given food here. There was no coughing or choking. I believe that this is a function of her dementia. She probably has some transfer dysphagia. I recommend to caregivers back to the nursing home that the patient have a swallow study performed. They are instructed to have him follow-up with the facility physician and get that ordered ASAP. They are instructed to have the patient continue all medications as prescribed. They are instructed return this patient to the emergency room immediately for any new or worsening complaints. I did attempt to discuss this with the patient at bedside however she is very demented and she just smiled and said okay. Patient is subsequently discharged back to her nursing home in stable condition. Patient has what appears to be Alzheimer's dementia. Caregivers are instructed to have the patient follow-up with a primary care physician as they may need a swallow study performed. Choking is common in dementia. Caregivers are instructed to give all medications as prescribed. They are instructed to follow-up as directed. They are instructed to return to the emergency room immediately for any new or worsening complaints. CRITICAL CARE:   None    CONSULTS:  1    PROCEDURES:  None    FINAL IMPRESSION      1. Choking, initial encounter    2.  Late onset Alzheimer's dementia with other behavioral disturbance, unspecified dementia severity [facility])          DISPOSITION/PLAN   Discharge    PATIENT REFERRED TO:  Jasmin Adan MD  [address] [place] [facility] [phone]    Call in 1 day      DISCHARGE MEDICATIONS:  Discharge Medication List as of 2/19/2023  5:59 PM          (Please note that portions of this note were completed with a voice recognition program.  Efforts were made to edit the dictations but occasionally words are mis-transcribed.)    DO Haylee White DO  02/20/23 2651

## 2023-02-21 PROBLEM — I48.4 ATYPICAL ATRIAL FLUTTER (HCC): Status: ACTIVE | Noted: 2023-02-21

## 2023-02-22 LAB
EKG ATRIAL RATE: 77 BPM
EKG P AXIS: 53 DEGREES
EKG P-R INTERVAL: 190 MS
EKG Q-T INTERVAL: 398 MS
EKG QRS DURATION: 108 MS
EKG QTC CALCULATION (BAZETT): 450 MS
EKG R AXIS: 6 DEGREES
EKG T AXIS: 102 DEGREES
EKG VENTRICULAR RATE: 77 BPM

## 2023-02-22 PROCEDURE — 93010 ELECTROCARDIOGRAM REPORT: CPT | Performed by: NUCLEAR MEDICINE

## 2023-05-22 PROBLEM — F31.9 BIPOLAR DISORDER (HCC): Status: ACTIVE | Noted: 2023-05-22

## 2023-08-14 PROBLEM — E11.9 DM (DIABETES MELLITUS) (HCC): Status: RESOLVED | Noted: 2018-03-10 | Resolved: 2023-08-14

## 2023-11-19 PROBLEM — G30.9 DAT (DEMENTIA OF ALZHEIMER TYPE) (HCC): Status: ACTIVE | Noted: 2018-03-24

## 2023-11-19 PROBLEM — Z51.5 HOSPICE CARE PATIENT: Status: ACTIVE | Noted: 2023-11-19

## 2023-11-19 PROBLEM — F02.80 DAT (DEMENTIA OF ALZHEIMER TYPE) (HCC): Status: ACTIVE | Noted: 2018-03-24

## 2023-11-19 PROBLEM — R63.4 WEIGHT LOSS: Status: ACTIVE | Noted: 2023-11-19
